# Patient Record
Sex: MALE | Race: WHITE
[De-identification: names, ages, dates, MRNs, and addresses within clinical notes are randomized per-mention and may not be internally consistent; named-entity substitution may affect disease eponyms.]

---

## 2017-02-11 ENCOUNTER — HOSPITAL ENCOUNTER (INPATIENT)
Dept: HOSPITAL 68 - ERH | Age: 82
LOS: 3 days | Discharge: HOME HEALTH | DRG: 309 | End: 2017-02-14
Attending: INTERNAL MEDICINE | Admitting: INTERNAL MEDICINE
Payer: COMMERCIAL

## 2017-02-11 VITALS — BODY MASS INDEX: 41.77 KG/M2 | WEIGHT: 308.37 LBS | HEIGHT: 72 IN

## 2017-02-11 VITALS — SYSTOLIC BLOOD PRESSURE: 100 MMHG | DIASTOLIC BLOOD PRESSURE: 80 MMHG

## 2017-02-11 VITALS — DIASTOLIC BLOOD PRESSURE: 70 MMHG | SYSTOLIC BLOOD PRESSURE: 126 MMHG

## 2017-02-11 DIAGNOSIS — I49.3: ICD-10-CM

## 2017-02-11 DIAGNOSIS — I35.0: ICD-10-CM

## 2017-02-11 DIAGNOSIS — I25.10: ICD-10-CM

## 2017-02-11 DIAGNOSIS — L40.9: ICD-10-CM

## 2017-02-11 DIAGNOSIS — M10.9: ICD-10-CM

## 2017-02-11 DIAGNOSIS — M19.90: ICD-10-CM

## 2017-02-11 DIAGNOSIS — I11.9: ICD-10-CM

## 2017-02-11 DIAGNOSIS — I48.92: ICD-10-CM

## 2017-02-11 DIAGNOSIS — I48.91: Primary | ICD-10-CM

## 2017-02-11 DIAGNOSIS — E78.5: ICD-10-CM

## 2017-02-11 DIAGNOSIS — I05.0: ICD-10-CM

## 2017-02-11 DIAGNOSIS — M81.0: ICD-10-CM

## 2017-02-11 DIAGNOSIS — Z95.1: ICD-10-CM

## 2017-02-11 DIAGNOSIS — J44.1: ICD-10-CM

## 2017-02-11 DIAGNOSIS — R00.8: ICD-10-CM

## 2017-02-11 DIAGNOSIS — Z95.2: ICD-10-CM

## 2017-02-11 DIAGNOSIS — I45.10: ICD-10-CM

## 2017-02-11 LAB
ABSOLUTE GRANULOCYTE CT: 2.7 /CUMM (ref 1.4–6.5)
APTT BLD: 34 SEC (ref 25–37)
APTT BLD: 71 SEC (ref 25–37)
BASOPHILS # BLD: 0 /CUMM (ref 0–0.2)
BASOPHILS NFR BLD: 0.1 % (ref 0–2)
EOSINOPHIL # BLD: 0.2 /CUMM (ref 0–0.7)
EOSINOPHIL NFR BLD: 4 % (ref 0–5)
ERYTHROCYTE [DISTWIDTH] IN BLOOD BY AUTOMATED COUNT: 14.6 % (ref 11.5–14.5)
GRANULOCYTES NFR BLD: 69.6 % (ref 42.2–75.2)
HCT VFR BLD CALC: 39.8 % (ref 42–52)
LYMPHOCYTES # BLD: 0.7 /CUMM (ref 1.2–3.4)
MCH RBC QN AUTO: 28.7 PG (ref 27–31)
MCHC RBC AUTO-ENTMCNC: 33.1 G/DL (ref 33–37)
MCV RBC AUTO: 86.8 FL (ref 80–94)
MONOCYTES # BLD: 0.4 /CUMM (ref 0.1–0.6)
PLATELET # BLD: 115 /CUMM (ref 130–400)
PMV BLD AUTO: 8.3 FL (ref 7.4–10.4)
PROTHROMBIN TIME: 11.5 SEC (ref 9.4–12.5)
RED BLOOD CELL CT: 4.58 /CUMM (ref 4.7–6.1)
WBC # BLD AUTO: 4 /CUMM (ref 4.8–10.8)

## 2017-02-11 NOTE — ED INFLUENZA/URI COMPLAINT
History of Present Illness
 
General
Chief Complaint: Upper Respiratory Sx/Fever
Stated Complaint: URI
Source: patient, family
Exam Limitations: no limitations
 
Vital Signs & Intake/Output
Vital Signs & Intake/Output
 Vital Signs
 
 
Date Time Temp Pulse Resp B/P Pulse O2 O2 Flow FiO2
 
     Ox Delivery Rate 
 
 1431    120/84    
 
 1250 96.3 70 18 124/86 97 Room Air  
 
 1107     95   
 
 1015     98   
 
 1000 96.0 74 20 129/93 98 Room Air  
 
 
Allergies
Coded Allergies:
NO KNOWN ALLERGIES (16)
 
Reconcile Medications
Albuterol Sulfate (Ventolin Hfa) 18 GM HFA.AER.AD   2 PUF INH Q4-6 PRN PRN 
ASTHMA  (Reported)
Aspirin (Ecotrin) 81 MG TABLET.DR   1 TAB PO DAILY HEART HEALTH  (Reported)
Calcipotriene 60 GM CREAM..G.   1 TATO TOP AD SKIN  (Reported)
FLUTICASONE/SALMETEROL (Advair 250-50 Diskus) 1 DSK DSK   1 PUFF INH BID 
BREATHING PROBLEMS  (Reported)
Folic Acid 1 MG TABLET   1 MG PO DAILY FOLIC ACID SUPPLEMENT  (Reported)
Furosemide (Lasix) 20 MG TAB   1 TAB PO DAILY CHF  (Reported)
Hydroxyzine HCl 25 MG TABLET   1 TAB PO Q8H itch
Magnesium Oxide 400 MG TABLET   400 MG PO DAILY supplement
Meloxicam 15 MG TABLET   1 TAB PO DAILY PAIN  (Reported)
Metoprolol Succ XL (Toprol XL) 25 MG TAB   25 MG PO DAILY heart
Simvastatin 40 MG TABLET   1 TAB PO QPM CHOLESTEROL  (Reported)
Triamcinolone Acetonide 15 GM OINT...G.   1 TATO TOP AD SKIN  (Reported)
     apply to affected area(s)
 
Triage Note:
TRIAGE; PT TO ED C/O TIGHTNESS TO MIDSTERNAL
REGION. HAS A HX OF ASTHMA. WENT TO PCP THE OTHER
DAY AND TOLD IT MAY BE THE START OF A CHEST COLD.
RA SAT 98% IN TRIAGE. PAIN IS NONRADIATING, FEELS
SOB UPON EXERTION. USED NEBULIZER AT HOME PTA WITH
SOME RELIEF.
Triage Nurses Notes Reviewed? yes
HPI:
83-year-old male arrives through triage to room 2 for evaluation of a cough that
started last night.  He has a history of asthma and has been using his inhalers 
since last night with no relief.  He denies any fever or chills but definitely 
complains of chest pain, tightness and shortness of breath.  He denies any 
abdominal pain, nausea, vomiting or diarrhea.  He reports he has been eating and
drinking well.  He reports his cough is productive green sputum and he denies 
any recent illness or being around anybody that has been sick.  He reports the 
chest pain is a tightness sensation around the top of his chest.  Mild at this 
time.
(EDWARDO LAMAS)
 
Past History
 
Travel History
Traveled to [place] past 21 day No
 
Medical History
Any Pertinent Medical History? see below for history
Neurological: NONE
EENT: PSORIASIS
Cardiovascular: CAD, mitral stenosis
Respiratory: asthma
Gastrointestinal: NONE
Hepatic: NONE
Renal: NONE
Musculoskeletal: gout, OSTEOARTHRITIS
Psychiatric: NONE
Endocrine: NONE
Blood Disorders: NONE
Cancer(s): NONE
GYN/Reproductive: NONE
Other Medical Hx:
Psoriasis
History of MRSA: No
History of VRE: No
History of CDIFF: No
Influenza Vaccine: 10/01/16
 
Surgical History
Surgical History: CABG
 
Psychosocial History
Who do you live with Patient/Self
Services at Home Home Health Aide, Nursing
What is your primary language English
Tobacco Use: Never used
 
Family History
Family History, If Any:
SISTER
  FH: diabetes mellitus
 
Hx Contributory? No
 
ECHO Results (as available)
EF% 55
(EDWARDO LAMAS)
 
Review of Systems
 
Review of Systems
Constitutional:
Denies: no symptoms. 
EENTM:
Denies: no symptoms. 
Respiratory:
Reports: cough, short of breath, sputum production. 
Cardiovascular:
Reports: see HPI, chest pain, edema. 
GI:
Denies: no symptoms. 
Genitourinary:
Denies: no symptoms. 
Musculoskeletal:
Denies: no symptoms. 
Skin:
Denies: no symptoms. 
Neurological/Psychological:
Denies: no symptoms. 
Hematologic/Endocrine:
Denies: no symptoms. 
Immunologic/Allergic:
Denies: no symptoms. 
(EDWARDO LAMAS)
 
Physical Exam
 
Physical Exam
General Appearance: well developed/nourished, no apparent distress, alert, awake
Head: atraumatic, normal appearance
Eyes:
Bilateral: normal appearance, PERRL, EOMI. 
Ears, Nose, Throat: normal ENT inspection, moist mucous membrane
Neck: normal inspection, supple, full range of motion
Respiratory: wheezing
Cardiovascular: irregularly irregular
Peripheral Pulses:
2+ radial (R), 2+ radial (L)
Gastrointestinal: normal bowel sounds, soft, non-tender
Back: normal inspection, normal range of motion
Extremities: swelling (BILATERAL LOWER EXTREMITY KHADIJAH)
Neurologic/Psych: no motor/sensory deficits, awake, alert, oriented x 3, normal 
gait, normal mood/affect
Skin: intact, normal color, warm/dry
 
Core Measures
Severe Sepsis Present: No
Septic Shock Present: No
(EDWARDO LAMAS)
 
Progress
Differential Diagnosis: pneumonia, ami, copd EXACERBATION, ASTHMA EXACERBATION, 
BRONCHITIS, NEW ONSET a. FIB OR a. FIB
Plan of Care:
 Orders
 
 
Procedure Date/time Status
 
CBC WITHOUT DIFFERENTIAL  0600 Active
 
BASIC ELECTROLYTES PLUS BUN&CR  0600 Active
 
Regular Diet  L Active
 
LACTIC ACID  1732 Active
 
TROPONIN LEVEL  1700 Active
 
TROPONIN LEVEL  1432 Active
 
LACTIC ACID  1432 Active
 
CULTURE,URINE  1425 Active
 
LOWER RESPIRATORY CULTURE  1425 Active
 
URINALYSIS  1425 Active
 
Vital Signs  1420 Active
 
Teach/Educate  1420 Active
 
Nutritional Intake, Monitor  1420 Active
 
Isolation  1420 Active
 
Intake & Output  1420 Active
 
Patient Care Conference  1420 Active
 
Activity/Ambulation  1420 Active
 
TRC EVALUATION (GEN)  1328 Active
 
Pathway - chart  1328 Active
 
House Staff  1328 Active
 
Patient Data  1328 Active
 
Code Status  1328 Active
 
Intake & Output  1248 Active
 
Patient Data  1225 Active
 
OXYGEN SETUP (GEN)  1222 Active
 
Saline Lock  1222 Active
 
Admit to inpatient  1222 Active
 
Vital Signs  1222 Active
 
Activity/Ambulation  1222 Active
 
Code Status  1222 Complete
 
Add-on Test (ER Only)  1055 Active
 
AEROSOL (GEN)  1047 Complete
 
Saline Lock  1026 Active
 
Telemetry/Cardiac Monitor  1026 Active
 
BLOOD CULTURE  1026 Active
 
TROPONIN LEVEL  1026 Complete
 
PARTIAL THROMBOPLASTIN TIME  1026 Complete
 
PROTHROMBIN TIME  1026 Complete
 
COMPREHENSIVE METABOLIC PANEL  1026 Complete
 
CBC WITHOUT DIFFERENTIAL  1026 Complete
 
B-TYPE NATRIURETIC PEP (BNP)  1026 Complete
 
EKG  1026 Active
 
Weight   UNK Active
 
VTE Mechanical Prophylaxis   UNK Active
 
Vital Signs   UNK Complete
 
Telemetry/Cardiac Monitor   UNK Active
 
Intake & Output   UNK Complete
 
 
 Current Medications
 
 
  Sig/Angela Start time  Last
 
Medication Dose  Stop Time Status Admin
 
Azithromycin 500 MG DAILY@1230  1230 AC 
 
(Zithromax)     
 
Dextrose/Water 250 ML    
 
(D5W)     
 
Ceftriaxone Sodium 1,000 MG DAILY@1230  1230 AC 
 
(Rocephin)     
 
Prednisone 60 MG DAILY  1000 AC 
 
Acetaminophen 650 MG Q6 PRN  1330 AC 
 
(Tylenol)     
 
Oxycodone HCl 5 MG Q6P PRN  1330 AC 
 
(Roxicodone)     
 
Oxycodone/ 2 TAB Q6P PRN  1330 AC 
 
Acetaminophen     
 
(Percocet)     
 
 
 Laboratory Tests
 
 
 
17 1444:
Lactic Acid Pending, Troponin I Pending
 
17 1111:
PT 11.5, INR 1.10, APTT 34
 
17 1100:
Anion Gap 8, Estimated GFR > 60, BUN/Creatinine Ratio 19.0, Glucose 85, Calcium 
8.2  L, Total Bilirubin 0.7, AST 35, ALT 44, Alkaline Phosphatase 76, Troponin I
0.04, Pro-B-Natriuretic Pept 4390  H, Total Protein 6.2  L, Albumin 3.5, 
Globulin 2.7, Albumin/Globulin Ratio 1.3, CBC w Diff NO MAN DIFF REQ, RBC 4.58  
L, MCV 86.8, MCH 28.7, RDW 14.6  H, MPV 8.3, Gran % 69.6, Lymphocytes % 17.1  L,
Monocytes % 9.2, Eosinophils % 4.0, Basophils % 0.1, Absolute Granulocytes 2.7, 
Absolute Lymphocytes 0.7  L, Absolute Monocytes 0.4, Absolute Eosinophils 0.2, 
Absolute Basophils 0, PUBS MCHC 33.1
 Microbiology
 1425  URINE ROUT: Urine Culture - ORD
 1425  LOWER RESP: Respiratory Culture - ORD
 1425  LOWER RESP: Gram Stain - ORD
 1100  BLOOD: Blood Culture - RECD
 1054  BLOOD: Blood Culture - RECD
 
Diagnostic Imaging:
Viewed by Me: Radiology Read.  Discussed w/RAD: Radiology Read. 
CXR Impression: SEE BELOW
Initial ED EKG: ATRIAL FLUTTER, pvcS, RIGHT BUNDLE BRANCH BLOCK WITH A LEFT 
ANTERIOR FASCICULAR BLOCK
Prior EKG: changed
Comments:
PATIENT: CHRISTELLE CRUZ  MEDICAL RECORD NO: [id number]
PRESENT AGE: 83  PATIENT ACCOUNT NO: [id number]
: 33  LOCATION: [facility]
ORDERING PHYSICIAN: EDWARDO CARRASQUILLO  
 
  SERVICE DATE: 
EXAM TYPE: RAD - XRY-CHEST XRAY, PA AND LATERAL
 
EXAMINATION:
XR CHEST
 
CLINICAL INFORMATION:
Pneumonia
 
COMPARISON:
Chest done on 2016.
 
TECHNIQUE:
2 views of the chest were obtained.
 
FINDINGS:
Low lung volume is present bilaterally. Subtle airspace opacity is noted at
right lung base likely represents hypoventilatory, atelectatic changes or
subtle developing pneumonia. The remainder of the lung fields otherwise
appear clear. The cardiomediastinal silhouette is mildly enlarged. Postop
changes of sternotomy are present. Multilevel degenerative changes are noted
in the spine. There is a prosthetic, likely aortic valve, visualized. There
is no pleural effusion present.
 
IMPRESSION:
Low lung volume bilaterally with interval development of subtle airspace
disease at right lung base, may represent hypoventilatory, atelectatic
changes versus subtle developing pneumonia. Followup radiograph to resolution
is recommended. No other significant change since 2016.
 
DICTATED BY: MANUEL CELESTE MD 
DATE/TIME DICTATED:17
:RAD.[facility]ODAS 
DATE/TIME TRANSCRIBED:17
 
CONFIDENTIAL, DO NOT COPY WITHOUT APPROPRIATE AUTHORIZATION.
 
 <Electronically signed in Other Vendor System>                                 
          
                                           SIGNED BY: MANUEL CELESTE MD 17
1125
 
12:16 PM spoke to patient and family about blood work, chest x-ray results and 
do ECG finding.  Discussed the case with Dr. Mckenzie.  Hospitalist was notified 
and will admit to telemetry for pneumonia with new onset atrial flutter.  Did 
not start anticoagulation at this time, rate controlled and will discuss further
with Dr. Tolentino.
 
 
1:40 PM spoke to Dr. Tolentino we will start heparin drip with a bolus.  Rectal 
exam negative.
(CHONG CARRASQUILLO,EDWARDO)
 
Departure
 
Departure
Time of Disposition: 1217
Disposition: STILL A PATIENT
Condition: Stable
Clinical Impression
Primary Impression: New onset atrial flutter
Secondary Impressions: 
Pneumonia
Qualifiers:  Pneumonia type: due to unspecified organism Laterality: right Lung 
location: lower lobe of lung Qualified Code: J18.1 - Lobar pneumonia, 
unspecified organism
 
Referrals:
ALBERTA ALONZO (PCP/Family)
 
Departure Forms:
Customer Survey
General Discharge Information
 
Admission Note
Spoke With:
AMARIS ROSS MD
Documentation of Exam:
Documentation of any treatments & extenuating circumstances including Concerns 
Regarding Discharge (functional status, medication knowledge or non-compliance, 
living conditions, etc.) that warrant an admission rather than observation: 
Admission to telemetry for new onset atrial flutter, cardiology consult to Dr. Tolentino.  Also admission for pneumonia will need respiratory treatments, IV 
antibiotics, steroids.
 
(EDWARDO LAMAS)
 
PA/NP Co-Sign Statement
Statement:
ED Attending supervision documentation-
 
x I saw and evaluated the patient. I have also reviewed all the pertinent lab 
results and diagnostic results. I agree with the findings and the plan of care 
as documented in the PA's/NP's documentation. 
 
[] I have reviewed the ED Record and agree with the PA's/NP's documentation.
 
[] Additions or exceptions (if any) to the PAs/NP's note and plan are 
summarized below:
[]
 
(SALLY LORENZO,MILES)

## 2017-02-11 NOTE — RADIOLOGY REPORT
EXAMINATION:
XR CHEST
 
CLINICAL INFORMATION:
Pneumonia
 
COMPARISON:
Chest done on 12/13/2016.
 
TECHNIQUE:
2 views of the chest were obtained.
 
FINDINGS:
Low lung volume is present bilaterally. Subtle airspace opacity is noted at
right lung base likely represents hypoventilatory, atelectatic changes or
subtle developing pneumonia. The remainder of the lung fields otherwise
appear clear. The cardiomediastinal silhouette is mildly enlarged. Postop
changes of sternotomy are present. Multilevel degenerative changes are noted
in the spine. There is a prosthetic, likely aortic valve, visualized. There
is no pleural effusion present.
 
IMPRESSION:
Low lung volume bilaterally with interval development of subtle airspace
disease at right lung base, may represent hypoventilatory, atelectatic
changes versus subtle developing pneumonia. Followup radiograph to resolution
is recommended. No other significant change since 12/13/2016.

## 2017-02-11 NOTE — HISTORY & PHYSICAL
JACINDA UNDERWOOD 02/11/17 1710:
General Information and HPI
MD Statement:
I have seen and personally examined CHRISTELLE CRUZ and documented this H&P.
 
The patient is a 83 year old M who presented with a patient stated chief 
complaint of [DYSPNEA].
 
Source of Information: patient
Exam Limitations: no limitations
History of Present Illness:
This is 82 YO male with past medical history of CAD S/P CABG, aortic stenosis, 
psoriasis, osteoarthritis, gout came in today from home after chief complaints 
of shortness of breath and cought with greenish sputum.
 
Apparently the patient was doing okay untill since last few days and especially 
since today mromega he started feeling shortenss of breath and productive cough 
with greenish sputum.He denied any chest pain, PND,fever, chiils, recent illness
, any sick contact.
 
He lives by himself and normally walks with a canem does all his IALDS himself. 
 
Allergies/Medications
Allergies:
Coded Allergies:
NO KNOWN ALLERGIES (05/17/16)
 
Home Med list
Albuterol Sulfate (Ventolin Hfa) 18 GM HFA.AER.AD   2 PUF INH Q4-6 PRN PRN 
ASTHMA  (Reported)
Aspirin (Ecotrin) 81 MG TABLET.DR   1 TAB PO DAILY HEART HEALTH  (Reported)
Calcipotriene 60 GM CREAM..G.   1 TATO TOP AD SKIN  (Reported)
FLUTICASONE/SALMETEROL (Advair 250-50 Diskus) 1 DSK DSK   1 PUFF INH BID 
BREATHING PROBLEMS  (Reported)
Folic Acid 1 MG TABLET   1 MG PO DAILY FOLIC ACID SUPPLEMENT  (Reported)
Furosemide (Lasix) 20 MG TAB   1 TAB PO DAILY CHF  (Reported)
Hydroxyzine HCl 25 MG TABLET   1 TAB PO Q8H itch
Magnesium Oxide 400 MG TABLET   400 MG PO DAILY supplement
Meloxicam 15 MG TABLET   1 TAB PO DAILY PAIN  (Reported)
Metoprolol Succ XL (Toprol XL) 25 MG TAB   25 MG PO DAILY heart
Simvastatin 40 MG TABLET   1 TAB PO QPM CHOLESTEROL  (Reported)
Triamcinolone Acetonide 15 GM OINT...G.   1 TATO TOP AD SKIN  (Reported)
     apply to affected area(s)
 
Compliance With Home Meds: GOOD
 
Past History
 
Travel History
Traveled to [place] past 21 day No
 
Medical History
Blood Transfusion Hx: No
Neurological: NONE
EENT: PSORIASIS
Cardiovascular: CAD, mitral stenosis
Respiratory: asthma
Gastrointestinal: NONE
Hepatic: NONE
Renal: NONE
Musculoskeletal: gout, OSTEOARTHRITIS
Psychiatric: NONE
Endocrine: NONE
Blood Disorders: NONE
Cancer(s): NONE
GYN/Reproductive: NONE
Other Medical Hx:
Psoriasis
History of MRSA: No
History of VRE: No
History of CDIFF: No
Isolation History: Standard
Influenza Vaccine: 10/01/16
 
Surgical History
Surgical History: CABG
 
ECHO Results (as available)
EF% 55
 
Past Family/Social History
 
Family History
Relations & Conditions if any
SISTER
  FH: diabetes mellitus
 
 
Psychosocial History
Where do you live? Home
Who Do You Live With? self
Services at Home: Home Health Aide, Nursing
Smoking Status: Never Smoked
ETOH Use: occasional use
Illicit Drug Use: denies illicit drug use
 
Functional Ability
ADLs
Independent: dressing, eating, toileting, bathing. 
Ambulation: independent
IADLs
Independent: shopping, housework, finances, food prep, telephone, transportation
, medication admin. 
 
Review of Systems
 
Review of Systems
Constitutional:
Denies: chills, diaphoresis, fever, malaise, weakness. 
EENTM:
Reports: no symptoms. 
Respiratory:
Reports: cough, sputum production, wheezing.  Denies: hemoptysis, orthopnea, 
short of breath. 
GI:
Denies: abdominal pain, bloating, constipation, diarrhea, distention. 
Genitourinary:
Denies: discharge, dysuria, frequency, hematuria. 
Musculoskeletal:
Denies: back pain, gout, joint pain, joint swelling. 
Skin:
Denies: cysts, change in skin color, change in hair/nails, dryness. 
Neurological/Psychological:
Reports: no symptoms. 
Hematologic/Endocrine:
Reports: no symptoms. 
Immunologic/Allergic:
Reports: no symptoms. 
All Other Systems: Reviewed and Negative
 
Exam & Diagnostic Data
Last 24 Hrs of Vital Signs/I&O
 Vital Signs
 
 
Date Time Temp Pulse Resp B/P Pulse O2 O2 Flow FiO2
 
     Ox Delivery Rate 
 
02/11 1621 97.5 72 18 126/70 95 Room Air  
 
02/11 1511     97 Nasal 2.0L 
 
      Cannula  
 
02/11 1431    120/84    
 
02/11 1250 96.3 70 18 124/86 97 Room Air  
 
02/11 1107     95   
 
02/11 1015     98   
 
02/11 1000 96.0 74 20 129/93 98 Room Air  
 
 
 Intake & Output
 
 
 02/11 1600 02/11 0800 02/11 0000
 
Intake Total 250  
 
Output Total 400  
 
Balance -150  
 
    
 
Intake, Oral 250  
 
Output, Urine 400  
 
Patient 144.696 kg  
 
Weight   
 
 
 
 
Physical Exam
General Appearance Alert, Oriented X3, Cooperative, No Acute Distress
Skin No Rashes, No Breakdown, No Significant Lesion
HEENT Atraumatic, PERRLA, EOMI
Neck Supple, No JVD, No thryomegaly
Lymphatic B/L LOWER EXTREMITY EDEMA
Cardiovascular Normal S1, Normal S2, No Murmurs
Lungs B/L WHEEZING PRESENT
Abdomen Normal Bowel Sounds, Soft, No Tenderness
Neurological Strength at 5/5 X4 Ext, Normal Tone, Sensation Intact, Cranial 
Nerves 3-12 NL
Extremities No Clubbing, No Cyanosis, No Edema, Normal Pulses
Vascular Normal Pulses
Last 24 Hrs of Labs/Dusty:
 Laboratory Tests
 
02/11/17 1815:
Troponin I Pending
 
02/11/17 1815:
Lactic Acid Pending
 
02/11/17 1455:
Urine Color STRAW, Urine Clarity CLEAR, Urine pH 6.0, Ur Specific Gravity <= 
1.005, Urine Protein NEG, Urine Ketones NEG, Urine Nitrite NEG, Urine Bilirubin 
NEG, Urine Urobilinogen 1.0, Ur Leukocyte Esterase TRACE  H, Ur Microscopic 
SEDIMENT EXAMINED, Urine WBC RARE, Ur Epithelial Cells RARE, Urine Hemoglobin 
NEG, Urine Glucose NEG
 
02/11/17 1444:
Lactic Acid 1.5, Troponin I 0.04
 
02/11/17 1111:
PT 11.5, INR 1.10, APTT 34
 
02/11/17 1100:
Anion Gap 8, Estimated GFR > 60, BUN/Creatinine Ratio 19.0, Glucose 85, Calcium 
8.2  L, Total Bilirubin 0.7, AST 35, ALT 44, Alkaline Phosphatase 76, Troponin I
0.04, Pro-B-Natriuretic Pept 4390  H, Total Protein 6.2  L, Albumin 3.5, 
Globulin 2.7, Albumin/Globulin Ratio 1.3, CBC w Diff NO MAN DIFF REQ, RBC 4.58  
L, MCV 86.8, MCH 28.7, RDW 14.6  H, MPV 8.3, Gran % 69.6, Lymphocytes % 17.1  L,
Monocytes % 9.2, Eosinophils % 4.0, Basophils % 0.1, Absolute Granulocytes 2.7, 
Absolute Lymphocytes 0.7  L, Absolute Monocytes 0.4, Absolute Eosinophils 0.2, 
Absolute Basophils 0, PUBS MCHC 33.1
 Microbiology
02/11 1455  URINE ROUT: Urine Culture - RECD
02/11 1455  LOWER RESP: Respiratory Culture - RES
02/11 1455  LOWER RESP: Gram Stain - RES
02/11 1100  BLOOD: Blood Culture - RECD
02/11 1054  BLOOD: Blood Culture - RECD
 
 
 
Diagnostic Data
EKG Results
will repeat EKG as the EKG was not good qualtiy
CXR Results
IMPRESSION:
Low lung volume bilaterally with interval development of subtle airspace
disease at right lung base, may represent hypoventilatory, atelectatic
changes versus subtle developing pneumonia. Followup radiograph to resolution
is recommended. No other significant change since 12/13/2016.
 
 
Assessment/Plan
Assessment:
This is 82 YO male with past medical history of CAD S/P CABG, aortic stenosis, 
psoriasis, osteoarthritis, gout, MS,COPD not on home oxygen ( need to confirm 
with niece came in today from home after chief complaints of shortness of breath
and cought with greenish sputum and substernal chest discomfort.
 
Vitals : afebrile, normotensive, pulse - 70, rr - 20, 95% on 2 litres.
Labs : white count - 4.0. h/h - 13.1/39.8, plt - 115
 
CXR : Low lung volume bilaterally with interval development of subtle airspace
disease at right lung base, may represent hypoventilatory, atelectatic
changes versus subtle developing pneumonia. Followup radiograph to resolution
is recommended.
 
1st lactic acid < 2 , but secodn icnreased at 3, will check another with am labs
to trend.
 
WE will admit the patient to telemetry for continuos cardiac monitoring.
 
Problem list alongwith assesment and plan :
#1 substernal Chest discomfort/ rule out ACS
* He descrined cheat pain which worsened with coughing, 3 to 4/10, denied 
pressure like pain or radiation.
* Continue to monitor vitals every shift.
* Continue telemetry monitoring, first set of troponin negatvie, continue to 
trend ekg and troponin.
* The first EKF had lot of artifacts, therefore please repeat EKG.
* Cardiology consulted.
* We will continue home medication of metoprolol and aspirin.
 
#2 Suspected Right Lower Lobe pnuemonia-
* He is afebrile, no white count, however he complained of cough with green 
sputum.
* Patient received one time ceftrixone and azithromycin.
* continue to follow pancultures.
* Ct azithromycin iv and ceftrixone for now, if cultures are negatvie and Mr. Gonzalez is stable, consider DC AX.
 
#3 Atrial Flutter v/s fib?
* initial EKF showed A fib, however it had lot of artifact
* ? A fib
* repeat EKG.
* ER doctor talked to DR garrido and he was started on iv heaprin
* Ct iv heparin
* continue to folloe cardiology.
 
 
#4 COPD Exacerbation
* PAtient had b/l wheezind and soudned a little tigth in chest.
* Ct TRC mebulisations.
* He is not on home oxygen
* ct o2 by nasal cannula
* taper as tolerated.
* Will treat with po prednisone 60 mg daily, ct to taper.
* Ct home inhalers.
 
#5 Hyperlipidemia
* Ct statin at home dosage.
 
#6 Psoriasis- 
* Ct hoem medication, calciptreione, triamcinolone for local application
 
#7 Lower extremtiy edema
* Ct lasix 20 mg Daily.
 
 
FC
DVT px : iv heaprin
heart healthy diet
 
 
As Ranked By This Provider
Problem List:
 1. Arthritis, wrist
 
 2. Hypoxia
 
 3. HTN (hypertension)
 
 4. New onset atrial flutter
 
 5. Hypotension
 
 
Core Measures/Miscellaneous
 
Acute Coronary Syndrome
ACS Diagnosis: No
 
Cerebrovascular Accident
CVA/TIA Diagnosis: No
 
Congestive Heart Failure
CHF Diagnosis: No
 
Venous Thromboembolism
VTE Risk Factors: Age > 40
VTE Prophylaxis Ordered Inpt: Pharm- Heparin
No [facility] V[facility] prophylaxis d/t: No contraindications
No VTE Pharm Prophylaxis d/t: No contraindications
VTE Diagnosis: No
VTE Type: NONE
VTE Confirmed by (Test): NONE
 
Severe Sepsis
Severe Sepsis Present: No
 
Septic Shock
Septic Shock Present: No
 
Miscellaneous Documentation
Attending Case Discussed With:
AMARIS ROSS MD
 
Primary Care Physician:
ALBERTA ALONZO
 
Patient sees these Specialists
cardio
Level of Patient Care: Telemetry
 
Resident Review Statement
Resident Statement: admitted by AMARIS Granados MD 02/11/17 1694:
Attending MD Review Statement
 
Attending Statement
Attending MD Statement: examined this patient, discuss w/resident/PA/NP, agreed 
w/resident/PA/NP, reviewed EMR data (avail), reviewed images, amended to note
Attending Assessment/Plan:
The patient is an 82 yo male with h/o psoriasis, HL, CAD (s/p CABG), obstructive
lung disease, mitral stenosis, gout, and HTN who presented in the ED on the day 
of admission with complaint of cough and dyspnea with sudden onset today along 
with some substernal chest discomfort (associated with cough). Cough has been 
productive of some green sputum. EKG in the ED suggested ?atrial fibrillation 
with PVCs (poor tracing). He denied any fever, chills or palpitations. CXR 
showed possible RLL infiltrate/atelectasis.
 
Physical Exam:
VS: T 96, P 74, R 20, /93, PO 98% RA
HEENT: eyes- PERRLA, EOMI
           janette- dry mucosa, w/o lesions
Neck: No JVD or bruits
Chest: + mild bilat wheeze with diminished BS and occasional rhonchi
Cor: sl irreg rhythm, nl rate, nl S1, S2 w/o murm
Abd: BS+, soft, NT, - HSM
Ext: no edema, pulses 2+
Neuro: non-focal
 
Labs/Tests: as above
 
Impression/Plan:
#Chest Pain/CAD- doubt ischemia pain as is atypical and associated with cough. 
Does have h/o CAD and is S/P CABG.
Plan: Admit to telemetry. 
        Check serial EKG's & troponins as per protocol.
        Cardiology consult- Dr. Tolentino (cover for Dr. Lopez).
         Continue Metoprolol and ASA.
 
#Atrial Fibrillation- ? initial EKG read as afib which would be new for this 
patient. There is a great deal of baseline artifact. 
Plan: Repeat EKG and monitor on telemetry. IV heparin per Dr. Tolentino. 
Continue Metoprolol.
 
#Right Lower Lobe infiltrate- vs atelectasis. Patient with green sputum, however
no fever or chills. Begun on Abx in ED. ? Community acquired pneumonia.
Plan: Will culture sputum and follow exam. Ceftriaxone/Zithromax begun in ED.
 
#COPD Exacerbation- some wheeze noted.
Plan: Albuterol aerosol, po prednisone and follow exam.
 
#Hyperlipidemia- on Simvastatin.
Plan: Continue Simvastatin.
 
#Psoriasis- on Calcipotriene Cream.
Plan: Continue Cream.

## 2017-02-11 NOTE — CONS- CARDIOLOGY
General Information and HPI
 
Consulting Request
Date of Consult: 02/10/17
Requested By:
AMARIS ROSS MD
 
Reason for Consult:
A-Fib
History of Present Illness:
The patient is an 80-year-old male with history of CAD, status post CABG, 
hypertension, COPD who presents to the emergency department with complaint of 
cough and shortness of breath times one day.  The cough has been practically 
greenish sputum.  He also complained of substernal chest tightness H was not 
exertional and did not radiate.  The discomfort was a 3-4 out of 10 in severity.
 In the emergency department he was found to be in new atrial fibrillation.  He 
was also noted to have evidence of pneumonia.  No diaphoresis.  No syncope.  No 
lightheadedness or dizziness.  No nausea or vomiting.
 
Allergies/Medications
Allergies:
Coded Allergies:
NO KNOWN ALLERGIES (05/17/16)
 
Home Med List:
Albuterol Sulfate (Ventolin Hfa) 18 GM HFA.AER.AD   2 PUF INH Q4-6 PRN PRN 
ASTHMA  (Reported)
Apixaban (Eliquis) 5 MG TABLET   5 MG PO Q12H AF
Aspirin (Ecotrin) 81 MG TABLET.DR   1 TAB PO DAILY HEART HEALTH  (Reported)
Calcipotriene 60 GM CREAM..G.   1 TATO TOP AD SKIN  (Reported)
FLUTICASONE/SALMETEROL (Advair 250-50 Diskus) 1 DSK DSK   1 PUFF INH BID 
BREATHING PROBLEMS  (Reported)
Folic Acid 1 MG TABLET   1 MG PO DAILY FOLIC ACID SUPPLEMENT  (Reported)
Furosemide (Lasix) 20 MG TAB   1 TAB PO DAILY CHF  (Reported)
Hydroxyzine HCl 25 MG TABLET   1 TAB PO Q8H itch
Magnesium Oxide 400 MG TABLET   400 MG PO DAILY supplement
Meloxicam 15 MG TABLET   1 TAB PO DAILY PAIN  (Reported)
Metoprolol Succ XL (Toprol XL) 25 MG TAB   25 MG PO DAILY heart
Prednisone 10 MG TABLET   1 TAB PO BID steroid taper
     On           Take
     2/14-2/16    3 tablets
     2/17-2/18    2 tablets
     2/19-2/20    1 tablets
     then stop
Simvastatin 40 MG TABLET   1 TAB PO QPM CHOLESTEROL  (Reported)
Triamcinolone Acetonide 15 GM OINT...G.   1 TATO TOP AD SKIN  (Reported)
     apply to affected area(s)
 
Current Medications:
 Current Medications
 
 
  Sig/Angela Start time  Last
 
Medication Dose Route Stop Time Status Admin
 
Acetaminophen 650 MG Q6 PRN 02/11 1330 AC 
 
  PO   
 
Albuterol Sulfate 3 ML ONCE ONE 02/11 1030 DC 02/11
 
  INH 02/11 1031  1046
 
Azithromycin 500 MG DAILY@1230 02/12 1230 AC 
 
Dextrose/Water 250 ML IV   
 
Azithromycin 500 MG ONCE ONE 02/11 1215 DC 02/11
 
Dextrose/Water 250 ML IV 02/11 1314  1233
 
Ceftriaxone Sodium 1,000 MG DAILY@1230 02/12 1230 AC 
 
  IV   
 
Ceftriaxone Sodium 0 .STK-MED ONE 02/11 1220 DC 
 
  .ROUTE   
 
Ceftriaxone Sodium 1,000 MG ONCE ONE 02/11 1215 DC 02/11
 
  IV 02/11 1216  1227
 
Enoxaparin Sodium 30 MG DAILY 02/11 1327 DC 
 
  SC   
 
Heparin Sodium  0 .STK-MED ONE 02/11 1350 DC 
 
(Porcine)  .ROUTE   
 
Heparin Sodium  5,000 UNIT ONCE ONE 02/11 1345 DC 02/11
 
(Porcine)  IV 02/11 1346  1423
 
N/A 1 UNIT    
 
Heparin Sodium/ 25,000 UNIT Q24H 02/11 1345 AC 02/11
 
Dextrose  IV   1427
 
Dextrose/Water 500 ML    
 
Ipratropium Bromide 2.5 ML ONCE ONE 02/11 1030 DC 02/11
 
  INH 02/11 1031  1046
 
Oxycodone HCl 5 MG Q6P PRN 02/11 1330 AC 
 
  PO   
 
Oxycodone/ 2 TAB Q6P PRN 02/11 1330 AC 
 
Acetaminophen  PO   
 
Prednisone 60 MG DAILY 02/12 1000 AC 
 
  PO   
 
Prednisone 0 .STK-MED ONE 02/11 1146 DC 
 
  PO   
 
Prednisone 60 MG ONCE ONE 02/11 1145 DC 02/11
 
  PO 02/11 1146  1147
 
 
 
 
Review of Systems
Review of Systems:
No rash.  No tremor.  No melena.  No syncope.  All other systems were reviewed, 
and were noted to be negative.
 
Past History
 
Travel History
Traveled to [place] past 21 day No
 
Medical History
Blood Transfusion Hx: No
Neurological: NONE
EENT: PSORIASIS
Cardiovascular: CAD, mitral stenosis
Respiratory: asthma
Gastrointestinal: NONE
Hepatic: NONE
Renal: NONE
Musculoskeletal: gout, OSTEOARTHRITIS
Psychiatric: NONE
Endocrine: NONE
Blood Disorders: NONE
Cancer(s): NONE
GYN/Reproductive: NONE
Other Medical Hx:
Psoriasis
 
Surgical History
Surgical History: CABG
 
Family History
Relations & Conditions If Any:
SISTER
  FH: diabetes mellitus
 
 
Psychosocial History
Where Do You Live? Home
Who Do You Live With? self
Services at Home: Home Health Aide, Nursing
Smoking Status: Never Smoked
ETOH Use: occasional use
Illicit Drug Use: denies illicit drug use
 
Functional Ability
ADLs
Independent: dressing, eating, toileting, bathing. 
Ambulation: independent
IADLs
Independent: shopping, housework, finances, food prep, telephone, transportation
, medication admin. 
 
ECHO Results (as available)
EF% 55
 
Exam & Diagnostic Data
Vital Signs and I&O
Vital Signs
 
 
Date Time Temp Pulse Resp B/P Pulse O2 O2 Flow FiO2
 
     Ox Delivery Rate 
 
02/11 1621 97.5 72 18 126/70 95 Room Air  
 
02/11 1511     97 Nasal 2.0L 
 
      Cannula  
 
02/11 1431    120/84    
 
02/11 1250 96.3 70 18 124/86 97 Room Air  
 
02/11 1107     95   
 
02/11 1015     98   
 
02/11 1000 96.0 74 20 129/93 98 Room Air  
 
 
 Intake & Output
 
 
 02/11 1600 02/11 0800 02/11 0000 02/10 1600 02/10 0800 02/10 0000
 
Intake Total 250     
 
Output Total 400     
 
Balance -150     
 
       
 
Intake, Oral 250     
 
Output, Urine 400     
 
Patient 319 lb     
 
Weight      
 
 
 
Physical Exam:
Gen: The patient is in no acute distress
HEENT: Normal nose, ears, and oropharynx.  Pupils equal bilaterally.  
Conjunctiva normal.
Neck: Supple with no JVD, no masses, and no thyromegaly
Lungs: Bilateral wheeze, scattered rhonchi with normal respiratory effort
Heart: Irregularly irregular, S1, S2, no murmurs.  No peripheral edema, 2+ 
pulses in the lower extremities bilaterally
Abdomen:  Soft, nontender, no masses.  No hepatomegaly.  No splenomegaly
Extremities: No clubbing or cyanosis.  Normal muscle strength in the upper and 
lower extremities
Skin: Normal skin turgor with no skin ulcers or lesions noted.
Neuro: Cranial nerves intact.  Sensation intact
Psych: Alert and oriented  3 with appropriate affect
 
 
 
Labs/Dusty Results:
 Laboratory Tests
 
 
 02/11 02/11 02/11 02/11
 
 2045 1815 1815 1455
 
Chemistry    
 
  Lactic Acid (0.7 - 2.1 mmol/L)   3.0  H 
 
  Troponin I (<0.11 ng/ml)  0.03  
 
Coagulation    
 
  APTT (25 - 37 SEC) 71  H   
 
Urines    
 
  Urine Color (YEL,AMB,STR)    STRAW
 
  Urine Clarity (CLEAR)    CLEAR
 
  Urine pH (5.0 - 8.0)    6.0
 
  Ur Specific Gravity (1.001 - 1.035)    <= 1.005
 
  Urine Protein (NEG,<30 MG/DL)    NEG
 
  Urine Ketones (NEG)    NEG
 
  Urine Nitrite (NEG)    NEG
 
  Urine Bilirubin (NEG)    NEG
 
  Urine Urobilinogen (0.1  -  1.0 EU/dl)    1.0
 
  Ur Leukocyte Esterase (NEG)    TRACE  H
 
  Ur Microscopic    SEDIMENT EXAMINED
 
  Urine WBC (0 - 2 /HPF)    RARE
 
  Ur Epithelial Cells (NONE,FEW)    RARE
 
  Urine Hemoglobin (NEG)    NEG
 
  Urine Glucose (N MG/DL)    NEG
 
 
 
 
 02/11 02/11 02/11
 
 1444 1111 1100
 
Chemistry   
 
  Sodium (137 - 145 mmol/L)   144
 
  Potassium (3.5 - 5.1 mmol/L)   4.1
 
  Chloride (98 - 107 mmol/L)   107
 
  Carbon Dioxide (22 - 30 mmol/L)   28
 
  Anion Gap (5 - 16)   8
 
  BUN (9 - 20 mg/dL)   19
 
  Creatinine (0.7 - 1.2 mg/dL)   1.0
 
  Estimated GFR (>60 ml/min)   > 60
 
  BUN/Creatinine Ratio (7 - 25 %)   19.0
 
  Glucose (65 - 99 mg/dL)   85
 
  Lactic Acid (0.7 - 2.1 mmol/L) 1.5  
 
  Calcium (8.4 - 10.2 mg/dL)   8.2  L
 
  Total Bilirubin (0.2 - 1.3 mg/dL)   0.7
 
  AST (17 - 59 U/L)   35
 
  ALT (21 - 72 U/L)   44
 
  Alkaline Phosphatase (< 127 U/L)   76
 
  Troponin I (<0.11 ng/ml) 0.04  0.04
 
  Pro-B-Natriuretic Pept (<125 pg/mL)   4390  H
 
  Total Protein (6.3 - 8.2 g/dL)   6.2  L
 
  Albumin (3.5 - 5.0 g/dL)   3.5
 
  Globulin (1.9 - 4.2 gm/dL)   2.7
 
  Albumin/Globulin Ratio (1.1 - 2.2 %)   1.3
 
Coagulation   
 
  PT (9.4 - 12.5 SEC)  11.5 
 
  INR (0.90 - 1.17)  1.10 
 
  APTT (25 - 37 SEC)  34 
 
Hematology   
 
  CBC w Diff   NO MAN DIFF REQ
 
  WBC (4.8 - 10.8 /CUMM)   4.0  L
 
  RBC (4.70 - 6.10 /CUMM)   4.58  L
 
  Hgb (14.0 - 18.0 G/DL)   13.1  L
 
  Hct (42 - 52 %)   39.8  L
 
  MCV (80.0 - 94.0 FL)   86.8
 
  MCH (27.0 - 31.0 PG)   28.7
 
  RDW (11.5 - 14.5 %)   14.6  H
 
  Plt Count (130 - 400 /CUMM)   115  L
 
  MPV (7.4 - 10.4 FL)   8.3
 
  Gran % (42.2 - 75.2 %)   69.6
 
  Lymphocytes % (20.5 - 51.1 %)   17.1  L
 
  Monocytes % (1.7 - 9.3 %)   9.2
 
  Eosinophils % (0 - 5 %)   4.0
 
  Basophils % (0.0 - 2.0 %)   0.1
 
  Absolute Granulocytes (1.4 - 6.5 /CUMM)   2.7
 
  Absolute Lymphocytes (1.2 - 3.4 /CUMM)   0.7  L
 
  Absolute Monocytes (0.10 - 0.60 /CUMM)   0.4
 
  Absolute Eosinophils (0.0 - 0.7 /CUMM)   0.2
 
  Absolute Basophils (0.0 - 0.2 /CUMM)   0
 
  PUBS MCHC (33.0 - 37.0 G/DL)   33.1
 
 
 
 
Diagnostic Data
EKG Results
EKG tracing is independently reviewed, and reveals atrial fibrillation with 
ventricular response of 107, premature ventricular complexes, right bundle 
branch block, left anterior fascicular block, possible lateral infarct, old
CXR Results
Chest x-ray:
Low lung volume bilaterally with interval development of subtle airspace
disease at right lung base, may represent hypoventilatory, atelectatic
changes versus subtle developing pneumonia. Followup radiograph to resolution
is recommended. No other significant change since 12/13/2016.
Other Results
Echocardiogram 10/29/16:
 1. This was a technically difficult and very limited study due to  
 the patient's body habitus. 
 2. The aortic valve was not well visualized.  By doppler criteria,  
 there is no evidence of valvular stenosis. 
 3. Significant thickening and calcification of the mitral leaflets  
 is present with moderate to severe anular calcification.  THere is  
 moderate mitral stenosis present with an estimated valve area of 1.0  
 cm2 with minimal to mild mitral insufficiency and mild tomoderate  
 left atrial enlargement. 
 4. No significant pericardial fluid was detected. 
 5. The left ventricular chamber size appears normal.  Accurate wall  
 motion assessment was not possible.  THe ejection fraction appears  
 normal.  A followup study with the administration of IV contrast  
 might be useful in this patient.  MIld diastolic dysfunction is  
 present. 
 6. THe right heart structures were not optimally assessed.  The RV  
 systolic pressure could not be assessed on this examination. 
 
Assessment/Plan
Assessment/Plan
Assessment:
1.  CAD, status post CABG
2.  History of moderate mitral stenosis
3.  Community acquired pneumonia and COPD exacerbation
4.  New-onset atrial fibrillation
 
Recommendations:
* IV heparin per protocol
* Check serial troponin to rule out myocardial infarction
* Agree with IV antibiotics
* Continue.  Metoprolol for rate control
 
 
 
Consult Acknowledgment
- Thank you for your consult request.

## 2017-02-12 VITALS — SYSTOLIC BLOOD PRESSURE: 106 MMHG | DIASTOLIC BLOOD PRESSURE: 60 MMHG

## 2017-02-12 VITALS — SYSTOLIC BLOOD PRESSURE: 108 MMHG | DIASTOLIC BLOOD PRESSURE: 76 MMHG

## 2017-02-12 LAB
ABSOLUTE GRANULOCYTE CT: 3.8 /CUMM (ref 1.4–6.5)
APTT BLD: 54 SEC (ref 25–37)
APTT BLD: 74 SEC (ref 25–37)
BASOPHILS # BLD: 0 /CUMM (ref 0–0.2)
BASOPHILS NFR BLD: 0.3 % (ref 0–2)
EOSINOPHIL # BLD: 0 /CUMM (ref 0–0.7)
EOSINOPHIL NFR BLD: 0.6 % (ref 0–5)
ERYTHROCYTE [DISTWIDTH] IN BLOOD BY AUTOMATED COUNT: 14.5 % (ref 11.5–14.5)
GRANULOCYTES NFR BLD: 70.6 % (ref 42.2–75.2)
HCT VFR BLD CALC: 37.8 % (ref 42–52)
LYMPHOCYTES # BLD: 0.9 /CUMM (ref 1.2–3.4)
MCH RBC QN AUTO: 29 PG (ref 27–31)
MCHC RBC AUTO-ENTMCNC: 32.8 G/DL (ref 33–37)
MCV RBC AUTO: 88.5 FL (ref 80–94)
MONOCYTES # BLD: 0.6 /CUMM (ref 0.1–0.6)
PLATELET # BLD: 112 /CUMM (ref 130–400)
PMV BLD AUTO: 8.8 FL (ref 7.4–10.4)
RED BLOOD CELL CT: 4.28 /CUMM (ref 4.7–6.1)
WBC # BLD AUTO: 5.3 /CUMM (ref 4.8–10.8)

## 2017-02-12 NOTE — PN- HOUSESTAFF
CARLI LORENZO,[facility] 17 1118:
Subjective
Follow-up For:
New onset atrial fibrillation
Pneumonia
Tele-Events Since Last Visit:
HANSEL vides heart rate 73-98
Subjective:
Patient was seen and examined today, he is alert oriented to place and person.  
He reported cough with green sputum, culture was sent this morning.  He denied 
any chest pain, shortness of breath, diaphoresis, lightheadedness.  No overnight
events reported by the nurse or the patient.  Vital signs are stable
 
Review of Systems
Constitutional:
Reports: see HPI. 
 
Objective
Last 24 Hrs of Vital Signs/I&O
 Vital Signs
 
 
Date Time Temp Pulse Resp B/P Pulse O2 O2 Flow FiO2
 
     Ox Delivery Rate 
 
 1134     98 Nasal 2.0L 
 
      Cannula  
 
 0942  65  110/62    
 
 0842 98.4 76 20 108/76 97 Nasal 2.0L 
 
      Cannula  
 
 0800      Nasal 2.0L 
 
      Cannula  
 
 0000      Nasal 2.0L 
 
      Cannula  
 
 2259      Nasal 2.0L 
 
      Cannula  
 
 2200 98.5 85 20 100/80 95 Nasal 2.0L 
 
      Cannula  
 
 1621 97.5 72 18 126/70 95 Room Air  
 
 1511     97 Nasal 2.0L 
 
      Cannula  
 
 1431    120/84    
 
 
 Intake & Output
 
 
  1600  0800  0000
 
Intake Total  100 100
 
Output Total  500 900
 
Balance  -400 -800
 
    
 
Intake, Oral  100 100
 
Output, Urine  500 900
 
Patient  140.84 kg 
 
Weight   
 
 
 
 
Physical Exam
General Appearance: Alert, oriented 2
Skin: psoriatic lesions bilateral lower extremity
HEENT: Atraumatic, PERRLA, EOMI, Mucous Membr. moist/pink
Cardiovascular: irregular irregular
Lungs: Clear to Auscultation, Normal Air Movement
Abdomen: Normal Bowel Sounds, Soft, No Tenderness
Neurological: Normal Speech, Strength at 5/5 X4 Ext, Normal Tone, Sensation 
Intact, Cranial Nerves 3-12 NL, Reflexes 2+
Extremities: No Clubbing, No Cyanosis, No Edema, Normal Pulses
 
Assessment/Plan
Assessment:
This is 84 YO male with past medical history of CAD S/P CABG, aortic stenosis, 
psoriasis, osteoarthritis, gout, MS,COPD not on home oxygen ( need to confirm 
with niece came in today from home after chief complaints of shortness of breath
and cought with greenish sputum and substernal chest discomfort.
 
CXR : Low lung volume bilaterally with interval development of subtle airspace
disease at right lung base, may represent hypoventilatory, atelectatic
changes versus subtle developing pneumonia. Followup radiograph to resolution
is recommended.
 
 
Problem list 
#1 substernal Chest discomfort/ rule out ACS
* Patient denied any chest pain, shortness of breath today 
* Cardiology consultation was obtained, thanks for recommendation 
* Continue IV heparin 
* Troponin trended down
* Continue aspirin 81 daily
 
#2 Suspected Right Lower Lobe pnuemonia-
* Temperature 98.4 Tmax 97.5 
* No leukocytosis 
* Culture pending 
* Ct azithromycin iv and ceftrixone for now, if cultures are negatvie and Mr. Gonzalez is stable, consider DC AX
* Lactic acid trended down
 
 
#3 Atrial Flutter v/s fib?
* New onset A. fib, heart rate 51-89 
* Ct iv heparin
* Continue metoprolol succinate 25 mg by mouth daily 
 
 
#4 COPD Exacerbation
* Ct TRC mebulisations.
* 2 L with 97% saturation 
* Prednisone 60 mg daily, ct to taper
* Ct home inhalers
 
#5 Hyperlipidemia
* Atorvastatin 40 daily
 
#6 Psoriasis- 
* Ct hoem medication, calciptreione, triamcinolone for local application
 
#7 Lower extremtiy edema
* Ct lasix 20 mg Daily.
 
 
FC
DVT px : iv heaprin
heart healthy diet
Problem List:
 1. New onset atrial flutter
 
 2. Chest pain
 
Pain Ratin
Pain Location:
None
Pain Goal: Pain 4 or less
Pain Plan:
Mild pain pathway
Tomorrow's Labs & Rationales:
CBC ALEXANDRE ROSS MD,AMARIS 17 1252:
Attending MD Review Statement
 
Attending Statement
Attending MD Statement: examined this patient, discuss w/resident/PA/NP, agreed 
w/resident/PA/NP, reviewed EMR data (avail), amended to note
Attending Assessment/Plan:
The patient was seen and discussed with house staff. Troponins negative thus far
, no chest pain today. Afib on monitor with rate controlled. Appreciate 
Cardiology follow-up. Continue care as outlined.

## 2017-02-12 NOTE — PN- CARDIOLOGY
Subjective
Subjective:
Feeling well.  No current chest pain.  No shortness breath.  No palpitations.  
He remains in atrial fibrillation with rate under control.
 
Objective
Vital Signs and I&Os
Vital Signs
 
 
Date Time Temp Pulse Resp B/P Pulse O2 O2 Flow FiO2
 
     Ox Delivery Rate 
 
02/12 1134     98 Nasal 2.0L 
 
      Cannula  
 
02/12 0942  65  110/62    
 
02/12 0842 98.4 76 20 108/76 97 Nasal 2.0L 
 
      Cannula  
 
02/12 0800      Nasal 2.0L 
 
      Cannula  
 
02/12 0000      Nasal 2.0L 
 
      Cannula  
 
02/11 2259      Nasal 2.0L 
 
      Cannula  
 
02/11 2200 98.5 85 20 100/80 95 Nasal 2.0L 
 
      Cannula  
 
02/11 1621 97.5 72 18 126/70 95 Room Air  
 
02/11 1511     97 Nasal 2.0L 
 
      Cannula  
 
02/11 1431    120/84    
 
 
 Intake & Output
 
 
 02/12 1600 02/12 0800 02/12 0000 02/11 1600 02/11 0800 02/11 0000
 
Intake Total  100 100 250  
 
Output Total  500 900 400  
 
Balance  -400 -800 -150  
 
       
 
Intake, Oral  100 100 250  
 
Output, Urine  500 900 400  
 
Patient  311 lb  319 lb  
 
Weight      
 
 
 
Physical Exam:
Gen: The patient is in no acute distress
HEENT: Normal nose, ears, and oropharynx.  Pupils equal bilaterally.  
Conjunctiva normal.
Neck: Supple with no JVD, no masses, and no thyromegaly
Lungs: Bilateral wheeze, scattered rhonchi with normal respiratory effort
Heart: Irregularly irregular, S1, S2, no murmurs.  No peripheral edema, 2+ 
pulses in the lower extremities bilaterally
Abdomen:  Soft, nontender, no masses.  No hepatomegaly.  No splenomegaly
Extremities: No clubbing or cyanosis.  Normal muscle strength in the upper and 
lower extremities
Skin: Normal skin turgor with no skin ulcers or lesions noted.
Neuro: Cranial nerves intact.  Sensation intact
Current Medications:
 Current Medications
 
 
  Sig/Angela Start time  Last
 
Medication Dose Route Stop Time Status Admin
 
Acetaminophen 650 MG Q6 PRN 02/11 1330 AC 
 
  PO   
 
Albuterol Sulfate 3 ML Q4P PRN 02/11 2315 AC 02/12
 
  INH   1130
 
Aspirin Buffered 81 MG DAILY 02/12 1000 AC 02/12
 
  PO   0941
 
Atorvastatin Calcium 40 MG DAILY 02/12 1000 AC 02/12
 
  PO   0942
 
Azithromycin 500 MG DAILY@1230 02/12 1230 AC 02/12
 
Dextrose/Water 250 ML IV   1239
 
Ceftriaxone Sodium 1,000 MG DAILY@1230 02/12 1230 AC 02/12
 
  IV   1239
 
Enoxaparin Sodium 30 MG DAILY 02/11 1327 DC 
 
  SC   
 
Folic Acid 1 MG DAILY 02/12 1000 AC 02/12
 
  PO   0942
 
Furosemide 20 MG DAILY 02/12 1000 AC 02/12
 
  PO   0942
 
Heparin Sodium  2,300 UNIT ONCE ONE 02/12 1140 DC 02/12
 
(Porcine)  IV 02/12 1141  1238
 
Heparin Sodium/ 25,000 UNIT Q24H 02/11 1345 AC 02/11
 
Dextrose  IV   1427
 
Dextrose/Water 500 ML    
 
Metoprolol Succinate 25 MG DAILY 02/12 1000 AC 02/12
 
  PO   0942
 
Oxycodone HCl 5 MG Q6P PRN 02/11 1330 AC 
 
  PO   
 
Oxycodone/ 2 TAB Q6P PRN 02/11 1330 AC 
 
Acetaminophen  PO   
 
Prednisone 60 MG DAILY 02/12 1000 AC 02/12
 
  PO   0942
 
Triamcinolone  1 TATO TIDPRN PRN 02/12 0130 AC 
 
Acetonide  TOP   
 
 
 
 
Results
Last 48 Hrs of Labs/Mics:
 Laboratory Tests
 
02/12/17 0857:
APTT 54  H
 
02/12/17 0742:
Anion Gap 5, Estimated GFR > 60, BUN/Creatinine Ratio 21.0, Lactic Acid 0.9, CBC
w Diff NO MAN DIFF REQ, RBC 4.28  L, MCV 88.5, MCH 29.0, RDW 14.5, MPV 8.8, Gran
% 70.6, Lymphocytes % 17.4  L, Monocytes % 11.1  H, Eosinophils % 0.6, Basophils
% 0.3, Absolute Granulocytes 3.8, Absolute Lymphocytes 0.9  L, Absolute 
Monocytes 0.6, Absolute Eosinophils 0, Absolute Basophils 0, PUBS MCHC 32.8  L
 
02/11/17 2045:
APTT 71  H
 
02/11/17 1815:
Troponin I 0.03
 
02/11/17 1815:
Lactic Acid 3.0  H
 
02/11/17 1455:
Urine Color STRAW, Urine Clarity CLEAR, Urine pH 6.0, Ur Specific Gravity <= 
1.005, Urine Protein NEG, Urine Ketones NEG, Urine Nitrite NEG, Urine Bilirubin 
NEG, Urine Urobilinogen 1.0, Ur Leukocyte Esterase TRACE  H, Ur Microscopic 
SEDIMENT EXAMINED, Urine WBC RARE, Ur Epithelial Cells RARE, Urine Hemoglobin 
NEG, Urine Glucose NEG
 
02/11/17 1444:
Lactic Acid 1.5, Troponin I 0.04
 
02/11/17 1111:
PT 11.5, INR 1.10, APTT 34
 
02/11/17 1100:
Anion Gap 8, Estimated GFR > 60, BUN/Creatinine Ratio 19.0, Glucose 85, Calcium 
8.2  L, Total Bilirubin 0.7, AST 35, ALT 44, Alkaline Phosphatase 76, Troponin I
0.04, Pro-B-Natriuretic Pept 4390  H, Total Protein 6.2  L, Albumin 3.5, 
Globulin 2.7, Albumin/Globulin Ratio 1.3, CBC w Diff NO MAN DIFF REQ, RBC 4.58  
L, MCV 86.8, MCH 28.7, RDW 14.6  H, MPV 8.3, Gran % 69.6, Lymphocytes % 17.1  L,
Monocytes % 9.2, Eosinophils % 4.0, Basophils % 0.1, Absolute Granulocytes 2.7, 
Absolute Lymphocytes 0.7  L, Absolute Monocytes 0.4, Absolute Eosinophils 0.2, 
Absolute Basophils 0, PUBS MCHC 33.1
 
 
Assessment/Plan
Assessment/Plan
Assessment:
1.  CAD, status post CABG
2.  History of moderate mitral stenosis
3.  History of bioprosthetic aortic valve
4.  Community acquired pneumonia and COPD exacerbation
5.  New-onset atrial fibrillation
 
Recommendations:
* IV heparin per protocol.  Change to oral anticoagulation (NOAC or warfarin) 
prior to discharge
* Myocardial infarction ruled out with negative troponin 3
* Agree with IV antibiotics
* Continue PO metoprolol for rate control.
Continue telemetry? Yes

## 2017-02-13 VITALS — SYSTOLIC BLOOD PRESSURE: 120 MMHG | DIASTOLIC BLOOD PRESSURE: 68 MMHG

## 2017-02-13 VITALS — SYSTOLIC BLOOD PRESSURE: 112 MMHG | DIASTOLIC BLOOD PRESSURE: 70 MMHG

## 2017-02-13 VITALS — DIASTOLIC BLOOD PRESSURE: 70 MMHG | SYSTOLIC BLOOD PRESSURE: 122 MMHG

## 2017-02-13 VITALS — SYSTOLIC BLOOD PRESSURE: 100 MMHG | DIASTOLIC BLOOD PRESSURE: 70 MMHG

## 2017-02-13 LAB
ABSOLUTE GRANULOCYTE CT: 4.7 /CUMM (ref 1.4–6.5)
APTT BLD: 65 SEC (ref 25–37)
BASOPHILS # BLD: 0 /CUMM (ref 0–0.2)
BASOPHILS NFR BLD: 0.3 % (ref 0–2)
EOSINOPHIL # BLD: 0 /CUMM (ref 0–0.7)
EOSINOPHIL NFR BLD: 0 % (ref 0–5)
ERYTHROCYTE [DISTWIDTH] IN BLOOD BY AUTOMATED COUNT: 14.6 % (ref 11.5–14.5)
GRANULOCYTES NFR BLD: 79.4 % (ref 42.2–75.2)
HCT VFR BLD CALC: 39.1 % (ref 42–52)
LYMPHOCYTES # BLD: 0.8 /CUMM (ref 1.2–3.4)
MCH RBC QN AUTO: 29 PG (ref 27–31)
MCHC RBC AUTO-ENTMCNC: 32.9 G/DL (ref 33–37)
MCV RBC AUTO: 88.1 FL (ref 80–94)
MONOCYTES # BLD: 0.4 /CUMM (ref 0.1–0.6)
PLATELET # BLD: 116 /CUMM (ref 130–400)
PMV BLD AUTO: 9.4 FL (ref 7.4–10.4)
PROTHROMBIN TIME: 11.4 SEC (ref 9.4–12.5)
RED BLOOD CELL CT: 4.44 /CUMM (ref 4.7–6.1)
WBC # BLD AUTO: 6 /CUMM (ref 4.8–10.8)

## 2017-02-13 NOTE — PN- HOUSESTAFF
JAKI LORENZO,EDGARD 17 0732:
Subjective
Follow-up For:
New onset atrial fibrillation
Pneumonia
Complaints: complaining of difficulty in the breathing
Tele-Events Since Last Visit:
Atrial flutter and fibrillation, bundle branch block, PVCs, trigeminy, heart 
rate between 60-70
Subjective:
Patient is seen and examined at the bedside. He was complaining of difficulty in
breathing, especially during inspiration. He feels the air is stucking in his 
chest and he is also feeling wheezy. He denies of any chest pain, palpitation, 
bleeding from any site of his body.
 
Review of Systems
Constitutional:
Reports: weakness.  Denies: chills, diaphoresis, fever, malaise. 
EENTM:
Denies: no symptoms. 
Cardiovascular:
Denies: chest pain, edema, orthopena, palpitations, peripheral edema. 
Respiratory:
Reports: short of breath, wheezing.  Denies: cough, hemoptysis, orthopnea, 
sputum production, stridor. 
Gastrointestinal:
Denies: abdominal pain, bloating, constipation, diarrhea, distention, bowel 
incontinence, melena, nausea, bloody stool. 
Genitourinary:
Denies: no symptoms. 
Musculoskeletal:
Reports: back pain. 
Skin:
Denies: no symptoms. 
Neurological/Psychological:
Reports: anxiety, depressed. 
 
Objective
Last 24 Hrs of Vital Signs/I&O
 Vital Signs
 
 
Date Time Temp Pulse Resp B/P Pulse O2 O2 Flow FiO2
 
     Ox Delivery Rate 
 
2053     97 Nasal 2.0L 
 
      Cannula  
 
 1600      Nasal 2.0L 
 
      Cannula  
 
 1533 97.7 72 20 122/70 96 Nasal 2.0L 
 
      Cannula  
 
 1003  74  112/70    
 
 0929     96 Nasal 2.0L 
 
      Cannula  
 
 0813 97.7 74 20 112/70 97 Nasal 2.0L 
 
      Cannula  
 
 0006 98.2 67 20 100/70 94 Room Air  
 
 0000      Nasal 2.0L 
 
      Cannula  
 
 
 Intake & Output
 
 
  1600  0800  0000
 
Intake Total 480 100 100
 
Output Total 700  150
 
Balance -220 100 -50
 
    
 
Intake, Oral 480 100 100
 
Output, Urine 700  150
 
Patient  141.181 kg 
 
Weight   
 
 
 
 
Physical Exam
General Appearance: Alert, Oriented X3, Cooperative, No Acute Distress
Skin: No Rashes, No Breakdown
HEENT: Atraumatic, PERRLA, EOMI
Neck: Supple, No JVD
Cardiovascular: Normal S1, Normal S2, irregular
Lungs: wheezing at the right side of upper chest
Abdomen: Soft, No Tenderness
Neurological: Normal Speech
Extremities: No Clubbing, No Cyanosis, No Edema
Vascular: Normal Pulses, Pulses Symmetrical
 
Assessment/Plan
Assessment:
Patient is a 82 YO male with past medical history of CAD S/P CABG, aortic 
stenosis, psoriasis, osteoarthritis, gout, MS,COPD not on home oxygen with chief
complaints of shortness of breath and cought with greenish sputum and substernal
chest discomfort.
 
CXR : Low lung volume bilaterally with interval development of subtle airspace 
disease at right lung base, may represent hypoventilatory, atelectatic changes 
versus subtle developing pneumonia. Followup radiograph to resolution is 
recommended.
 
Chest x-ray 2017-does not shows any signs of pneumonia
 
Problem list 
New onset atrial fibrillation
COPD
Hyperlipidemia
Psoriasis
 
Plan-
* We will stop IV heparin drip
* We will start patient on tab Eliquis 5 mgs PO twice a day
* We will stop injection ceftriaxone and azithromycin
* We will continue tab prednisone and taper it rapidly
* We will continue aspirin 81 daily
* We'll continue tablet Atorvastatin 40 daily
* We will continue home medication, calciptreione, triamcinolone for local 
application
* We'll continue tablet lasix 20 mg Daily.
* We will continue tablet metoprolol succinate 25 mg by mouth daily 
* TRC/nebulization
* We will get the oxygen to keep SPO2 more than 92%
* CODE STATUS-full code
* DVT prophylaxis-ALP S/heparin
* Diet-heart healthy diet
 
Problem List:
 1. New onset atrial flutter
 
 2. Chest pain
 
 3. HLD (hyperlipidemia)
 
 4. HTN (hypertension)
 
 5. Psoriasis
 
 6. COPD (chronic obstructive pulmonary disease)
 
Pain Ratin
Pain Location:
Not applicable
Pain Goal: Remain pain free
Pain Plan:
Mild
Tomorrow's Labs & Rationales:
none
DVT/Prophylaxis: mechanical, pharmacological
 
 
SPENSER SMITH MD 17 1200:
Attending MD Review Statement
 
Attending Statement
Attending MD Statement: examined this patient, discuss w/resident/PA/NP, agreed 
w/resident/PA/NP, reviewed EMR data (avail)
Attending Assessment/Plan:
Patient still reports SOB and weakness on standing.  His cough is persistent but
improved, and he is producing thick yellow sputum.  Afebrile, stable vitals.  HR
is now controlled.
 
Plan
- Discontinue heparin
- Start Eliquis
- Continue rate control
- Discontinue Ceftriaxone
- Continue Azithromycin
- Decrease Prednisone to 40mg daily
- Continue breathing treatments
- Contineu home medications
- PT evaluation
- Anticipated discharge tomorrow

## 2017-02-13 NOTE — PN- CARDIOLOGY
Subjective
Subjective:
Carlos Gonzalez is an 83-year-old man who I first met in 01/2012, referred from 
[facility] because of aortic stenosis identified on echocardiogram. His echo in
2012 showed a gradient of 55 mg peak and 32 mm of mean. He was followed along 
and developed shortness of breath in late 2012 which we thought was related to 
his aortic valve disease. He had cardiac catheterization in 10/2012, which 
showed severe aortic stenosis and single vessel LAD disease. He was referred to 
Dr. Ragland who did bioprosthetic aortic valve replacement and LAD graft and he 
ultimately did well. He had a saphenous vein graft to the LAD.
 
Subsequently Carlos has done pretty well. He has been basically asymptomatic 
from a cardiac standpoint. His echo in 03/2013 showed only a mild gradient 
across the valve of about 10 mmHg mean with good left ventricular systolic 
function.  We repeated his echocardiogram in September 2016, which now showed 
moderate aortic stenosis on his bioprosthetic valve with a peak gradient of 46 
mmHg and a mean gradient of 22 mmHg.  He was also having some ventricular ectopy
which was asymptomatic.  However he's never had atrial fibrillation previously. 
The patient was admitted now with respiratory symptoms and was found to be in 
atrial fibrillation/flutter.  He had a previous admission in October 2016 for 
chest pain which was negative.  He was in sinus rhythm on that admission.  He is
on low-dose metoprolol with a controlled rate.  He is also having some PVCs.  
However, these are asymptomatic.
 
Objective
Vital Signs and I&Os
Vital Signs
 
 
Date Time Temp Pulse Resp B/P Pulse O2 O2 Flow FiO2
 
     Ox Delivery Rate 
 
02/13 0813 97.7 74 20 112/70 97 Nasal 2.0L 
 
      Cannula  
 
02/13 0006 98.2 67 20 100/70 94 Room Air  
 
02/13 0000      Nasal 2.0L 
 
      Cannula  
 
02/12 2131     95 Nasal 2.0L 
 
      Cannula  
 
02/12 1616 98.6 70 20 106/60 95 Nasal 2.0L 
 
      Cannula  
 
02/12 1134     98 Nasal 2.0L 
 
      Cannula  
 
02/12 0942  65  110/62    
 
 
 Intake & Output
 
 
 02/13 1600 02/13 0800 02/13 0000 02/12 1600 02/12 0800 02/12 0000
 
Intake Total  100 100 730 100 100
 
Output Total   150 650 500 900
 
Balance  100 -50 80 -400 -800
 
       
 
Intake, IV    250  
 
Intake, Oral  100 100 480 100 100
 
Output, Urine   150 650 500 900
 
Patient  311 lb   311 lb 
 
Weight      
 
 
 
Physical Exam:
He is in no distress sitting in a chair.
 
HEENT exam is normal
Chest reveals a few coarse rales in the bases and some very mild expiratory 
wheezing
Heart reveals irregular rhythm with a grade 2 to 3/6 systolic ejection murmur at
the base
Abdomen is benign
Extremities reveal trace edema and chronic stasis skin changes.
Current Medications:
 Current Medications
 
 
  Sig/Angela Start time  Last
 
Medication Dose Route Stop Time Status Admin
 
Acetaminophen 650 MG Q6 PRN 02/11 1330 AC 
 
  PO   
 
Albuterol Sulfate 3 ML Q4P PRN 02/11 2315 AC 02/12
 
  INH   1130
 
Aspirin Buffered 81 MG DAILY 02/12 1000 AC 02/12
 
  PO   0941
 
Atorvastatin Calcium 40 MG DAILY 02/12 1000 AC 02/12
 
  PO   0942
 
Azithromycin 500 MG DAILY@1230 02/12 1230 AC 02/12
 
Dextrose/Water 250 ML IV   1239
 
Ceftriaxone Sodium 1,000 MG DAILY@1230 02/12 1230 AC 02/12
 
  IV   1239
 
Folic Acid 1 MG DAILY 02/12 1000 AC 02/12
 
  PO   0942
 
Furosemide 20 MG DAILY 02/12 1000 AC 02/12
 
  PO   0942
 
Heparin Sodium  2,300 UNIT ONCE ONE 02/12 1140 DC 02/12
 
(Porcine)  IV 02/12 1141  1238
 
Heparin Sodium/ 25,000 UNIT Q24H 02/11 1345 AC 02/13
 
Dextrose  IV   0530
 
Dextrose/Water 500 ML    
 
Metoprolol Succinate 25 MG DAILY 02/12 1000 AC 02/12
 
  PO   0942
 
Oxycodone HCl 5 MG Q6P PRN 02/11 1330 AC 
 
  PO   
 
Oxycodone/ 2 TAB Q6P PRN 02/11 1330 AC 
 
Acetaminophen  PO   
 
Prednisone 60 MG DAILY 02/12 1000 AC 02/12
 
  PO   0942
 
Triamcinolone  1 TATO TIDPRN PRN 02/12 0130 AC 
 
Acetonide  TOP   
 
 
 
 
Results
Last 48 Hrs of Labs/Mics:
 Laboratory Tests
 
02/13/17 0635:
Sodium Pending, Potassium Pending, Chloride Pending, Carbon Dioxide Pending, 
Anion Gap Pending, BUN Pending, Creatinine Pending, BUN/Creatinine Ratio Pending
, APTT Pending, CBC w Diff Pending, WBC Pending, RBC Pending, Hgb Pending, Hct 
Pending, MCV Pending, MCH Pending, RDW Pending, Plt Count Pending, MPV Pending, 
PUBS MCHC Pending
 
02/12/17 1725:
APTT 74  H
 
02/12/17 0857:
APTT 54  H
 
02/12/17 0742:
Anion Gap 5, Estimated GFR > 60, BUN/Creatinine Ratio 21.0, Lactic Acid 0.9, CBC
w Diff NO MAN DIFF REQ, RBC 4.28  L, MCV 88.5, MCH 29.0, RDW 14.5, MPV 8.8, Gran
% 70.6, Lymphocytes % 17.4  L, Monocytes % 11.1  H, Eosinophils % 0.6, Basophils
% 0.3, Absolute Granulocytes 3.8, Absolute Lymphocytes 0.9  L, Absolute 
Monocytes 0.6, Absolute Eosinophils 0, Absolute Basophils 0, [facility] MCHC 32.8  L
 
02/11/17 2045:
APTT 71  H
 
02/11/17 1815:
Troponin I 0.03
 
02/11/17 1815:
Lactic Acid 3.0  H
 
02/11/17 1455:
Urine Color STRAW, Urine Clarity CLEAR, Urine pH 6.0, Ur Specific Gravity <= 
1.005, Urine Protein NEG, Urine Ketones NEG, Urine Nitrite NEG, Urine Bilirubin 
NEG, Urine Urobilinogen 1.0, Ur Leukocyte Esterase TRACE  H, Ur Microscopic 
SEDIMENT EXAMINED, Urine WBC RARE, Ur Epithelial Cells RARE, Urine Hemoglobin 
NEG, Urine Glucose NEG
 
02/11/17 1444:
Lactic Acid 1.5, Troponin I 0.04
 
02/11/17 1111:
PT 11.5, INR 1.10, APTT 34
 
02/11/17 1100:
Anion Gap 8, Estimated GFR > 60, BUN/Creatinine Ratio 19.0, Glucose 85, Calcium 
8.2  L, Total Bilirubin 0.7, AST 35, ALT 44, Alkaline Phosphatase 76, Troponin I
0.04, Pro-B-Natriuretic Pept 4390  H, Total Protein 6.2  L, Albumin 3.5, 
Globulin 2.7, Albumin/Globulin Ratio 1.3, CBC w Diff NO MAN DIFF REQ, RBC 4.58  
L, MCV 86.8, MCH 28.7, RDW 14.6  H, MPV 8.3, Gran % 69.6, Lymphocytes % 17.1  L,
Monocytes % 9.2, Eosinophils % 4.0, Basophils % 0.1, Absolute Granulocytes 2.7, 
Absolute Lymphocytes 0.7  L, Absolute Monocytes 0.4, Absolute Eosinophils 0.2, 
Absolute Basophils 0, PUBS MCHC 33.1
 
 
Assessment/Plan
Assessment/Plan
Carlos presents with probable pneumonia and also was found to have "new onset" 
atrial fibrillation and ventricular ectopy with controlled response.  He is now 
on IV heparin.  He is on oral metoprolol.  His rate is within the normal range. 
I think based on his previous echoes etc. it is unlikely Carlos will be able to 
be gotten back to sinus rhythm.
 
 I recommend stopping heparin and starting him on Eliquis 5 mg twice daily.  
Hopefully we'll be able to take this in addition to his other medications when 
he goes home.  Apparently his niece does pour his meds for him and and we will 
have to give her instructions regarding this.
Continue telemetry? Yes

## 2017-02-13 NOTE — RADIOLOGY REPORT
EXAMINATION:
XR CHEST
 
CLINICAL INFORMATION:
Right lung base pneumonia. Need to confirm. Shortness of breath.
 
COMPARISON:
Several prior chest x-rays, most recent of which is dated 02/11/2017.
 
TECHNIQUE:
AP semierect sitting and lateral views of the chest were obtained.
 
FINDINGS:
The patient is status post median sternotomy and aortic valve replacement.
The cardiomediastinal silhouette is enlarged, unchanged. Mitral annular
calcifications are noted.
 
No evolving consolidation is seen in the medial right lung base. There are
persistent minimal linear opacities, most likely related to subsegmental
atelectasis and volume loss. Remainder of lungs clear. No effusion or
pneumothorax.
 
Moderate vertebral endplate spurring again seen in mid and lower thoracic
spine.
 
IMPRESSION:
 
1. No evidence of evolving pneumonia.
2. Minimal linear opacities in medial right lung base most consistent with
subsegmental atelectasis and volume loss. Clinical correlation requested.

## 2017-02-14 VITALS — SYSTOLIC BLOOD PRESSURE: 126 MMHG | DIASTOLIC BLOOD PRESSURE: 70 MMHG

## 2017-02-14 NOTE — PATIENT DISCHARGE INSTRUCTIONS
Discharge Instructions
 
General Discharge Information
You were seen/treated for:
rapid heart rate, leding to shortness of breath - atrial fibrillation
Special Instructions:
please follow up with your cardiologist with in a week of discharge.
please take medication as advised
please follow up with PCP with in a week of discharge
 
Diet
Recommended Diet: Heart Healthy
 
Activity
Full Activity/No Limits: No (as tolerated)
Activity Self Limited: No
 
Acute Coronary Syndrome
 
Inclusion Criteria
At DC or during hospital stay patient has or had the following:
ACS DIAGNOSIS No
 
Discharge Core Measures
Meds if any: Prescribed or Continued at Discharge
Meds if any: NOT Prescribed or Continued at Discharge
 
Congestive Heart Failure
 
Inclusion Criteria
At DC or during hospital stay patient has or had the following:
CHF DIAGNOSIS No
 
Discharge Core Measures
Meds if any: Prescribed or Continued at Discharge
Meds if any: NOT Prescribed or Continued at Discharge
 
Cerebrovascular accident
 
Inclusion Criteria
At DC or during hospital stay patient has or had the following:
CVA/TIA Diagnosis No
 
Discharge Core Measures
Meds if any: Prescribed or Continued at Discharge
Meds if any: NOT Prescribed or Continued at Discharge
 
Venous thromboembolism
 
Inclusion Criteria
VTE Diagnosis No
VTE Type NONE
VTE Confirmed by (Test) NONE
 
Discharge Core Measures
- Per Current guidelines, there needs to be overlap
- treatment for the first 5 days of Warfarin therapy.
- If discharged on Warfarin prior to 5 days of
- overlap therapy, the patient will need to be
- assessed for post discharge needs including
- *Post discharge parental anticoagulation
- *Warfarin and/or parental anticoagulation education
- *Follow up date to check INR post discharge
At least 5 days overlap therapy as Inpatient No
Meds if any: Prescribed or Continued at Discharge
Note: Overlap Therapy is Warfarin and Anticoagulant
Meds if any: NOT Prescribed or Continued at Discharge

## 2017-02-14 NOTE — PN- HOUSESTAFF
Subjective
Follow-up For:
new onset atrial fibrillation
Tele-Events Since Last Visit:
afib/aflutter  HR 
Subjective:
seen and examined patient, offers no complaints.  Feels well enough to be 
discharged today. 
 
Review of Systems
Constitutional:
Denies: chills, diaphoresis, fever, malaise, weakness, unexplained weight loss. 
Cardiovascular:
Denies: chest pain, edema, orthopena, palpitations, peripheral edema, syncope. 
Respiratory:
Denies: cough, hemoptysis, orthopnea, short of breath, sputum production, 
stridor, wheezing. 
 
Objective
Last 24 Hrs of Vital Signs/I&O
 Vital Signs
 
 
Date Time Temp Pulse Resp B/P Pulse O2 O2 Flow FiO2
 
     Ox Delivery Rate 
 
 0822 97.5 62 20 126/70    
 
 0811 97.5 62 20 126/70 96 Nasal 2.0L 
 
      Cannula  
 
 0800     96 Nasal 2.0L 
 
      Cannula  
 
 0000     96 Nasal 2.0L 
 
      Cannula  
 
 2331 98.1 64 20 120/68 97 Nasal  
 
      Cannula  
 
 2053     97 Nasal 2.0L 
 
      Cannula  
 
 1600      Nasal 2.0L 
 
      Cannula  
 
 1533 97.7 72 20 122/70 96 Nasal 2.0L 
 
      Cannula  
 
 
 Intake & Output
 
 
  1600  0800  0000
 
Intake Total 600 250 300
 
Output Total 1600 500 425
 
Balance -1000 -250 -125
 
    
 
Intake, IV   0
 
Intake, Oral 600 250 300
 
Number  0 0
 
Bowel   
 
Movements   
 
Output, Urine 1600 500 425
 
Patient  308 lb 
 
Weight   
 
 
 
 
Physical Exam
General Appearance: Alert, Oriented X3, Cooperative, No Acute Distress
Cardiovascular: Normal S1, Normal S2
Lungs: Normal Air Movement, b/l wheeze
Extremities: +2 edema
Current Medications:
 Current Medications
 
 
  Sig/Angela Start time  Last
 
Medication Dose Route Stop Time Status Admin
 
Acetaminophen 650 MG Q6 PRN  1330 AC 
 
  PO   
 
Albuterol Sulfate 3 ML Q4P PRN  2315 AC 
 
  INH   0925
 
Apixaban 5 MG BID  1000 CAN 
 
  PO   
 
Apixaban 5 MG Q12  1000 DC 
 
  PO  2201  
 
Apixaban 5 MG Q12H  0600 DC 
 
  PO   
 
Apixaban 5 MG Q12H  0600 AC 
 
  PO   0645
 
Apixaban 5 MG BID  1327 DC 
 
  PO   1610
 
Aspirin Buffered 81 MG DAILY  1000 AC 
 
  PO   0823
 
Atorvastatin Calcium 40 MG DAILY  1000 AC 
 
  PO   0823
 
Folic Acid 1 MG DAILY  1000 AC 
 
  PO   0823
 
Furosemide 20 MG DAILY  1000 AC 
 
  PO   0823
 
Metoprolol Succinate 25 MG DAILY  1000 AC 
 
  PO   0822
 
Oxycodone HCl 5 MG Q6P PRN  1330 AC 
 
  PO   
 
Oxycodone/ 2 TAB Q6P PRN  1330 AC 
 
Acetaminophen  PO   
 
Patient Medication  1 ED .STK-MED ONE  1348 DC 
 
Teaching  ED  1349  
 
Prednisone 40 MG DAILY  1000 AC 
 
  PO   0822
 
Triamcinolone  1 TATO TIDPRN PRN  0130 AC 
 
Acetonide  TOP   
 
 
 
 
Last 24 Hrs of Lab/Dusty Results
Last 24 Hrs of Labs/Mics:
 Laboratory Tests
 
17 1900:
APTT Cancelled
 
 
Assessment/Plan
Assessment:
83 year old gentleman with past medical history of CAD S/P CABG, aortic stenosis
, psoriasis, osteoarthritis, gout, MS,COPD not on home oxygen current admission 
for new onset afib and COPD exacerbation. Clinically improved and saturating 
well on RA.  
 
 
Problem list 
New onset atrial fibrillation
COPD exacerbation
Hyperlipidemia
Psoriasis
 
Plan-
* on Eliquis 5 mgs PO twice a day, gave 30 day coupon to niece and informed her 
about starting eliquis and its adverse effects, advised to follow up with his 
cardiologist within two weeks
* continue  prednisone taper 
* We will continue aspirin 81 daily
* We'll continue tablet Atorvastatin 40 daily
* We will continue home medication, calciptreione, triamcinolone for local 
application
* We'll continue tablet lasix 20 mg Daily.
* We will continue tablet metoprolol succinate 25 mg by mouth daily 
* DVT prophylaxis-ALP S/heparin
* Diet-heart healthy diet
* discharge to home with home services today. 
* full code
 
Problem List:
 1. Atrial fibrillation
 
 2. COPD (chronic obstructive pulmonary disease)
 
Pain Ratin
Pain Location:
na
Pain Goal: Pain 4 or less
Pain Plan:
current regimen
Tomorrow's Labs & Rationales:
none required

## 2017-02-14 NOTE — DISCHARGE SUMMARY
Visit Information
 
Visit Dates
Admission Date:
02/11/17
 
Discharge Date:
02/14/17
 
 
Hospital Course
 
Course
Attending Physician:
SPENSER SMITH MD
 
Primary Care Physician:
ALBERTA ALONZO
 
Hospital Course:
Patient is a 83-year-old male with significant past medical history of 
hypertension, hyperlipidemia, moderate mitral stenosis, history of bioprosthetic
aortic valve replacement and LAD graft,coronary artery disease, status post CABG
, COPD, gout, psoriasis, psoriatic arthritis, presented with chief complaints of
sudden onset of shortness of breath along with cough and expectoration with 
chest pain to the [facility] ED.
 
Vital signs at the time of admission-temperature 90.6, pulse 74, respiratory 
rate 20, blood pressure 129/93, SPO2 98% on room air.
 
EKG showed-atrial fibrillation with PACs.
 
New onset atrial fibrillation -
As the patient presented with chest pain and EKG shows atrial fibrillation. We 
admitted the patient to telemetry and started on IV heparin drip. Also did 
serial EKG and troponins. Serial troponins were negative. We also took 
cardiology consultation and follow the recommendation. After 24 hours. We 
stopped the IV heparin drip and he started patient on the Eliquis 5mg BID. We 
discharged with advice to follow-up with the cardiologist within a week of 
discharge.
 
COPD acute exacerbation, possible right lower lobe pneumonia
Initially, patient was complaining of cough with expectoration and chest x-ray 
showed possible right lower lobe pneumonia so we started patient on ceftriaxone 
and azithromycin, and supported with steroid for COPD. Later follow-up chest x-
ray was clear, without any evidence of pneumonia. So we stopped antibiotic and 
continued patient on tapering doses of prednisone. We discharged him with advice
to follow-up with PCP within a week. 
 
 
Allergies:
Coded Allergies:
NO KNOWN ALLERGIES (05/17/16)
 
 
Disposition Summary
 
Disposition
Principal Diagnosis:
New onset atrial fibrillation
COPD acute exacerbation
Additional Diagnosis:
Hyperlipidemia
Hypertension
Moderate mitral stenosis
CAD (s/p CABG), 
COPD 
Psoriasis 
Gout 
Discharge Disposition: home or self care
 
Discharge Instructions
 
General Discharge Information
Code Status: Full Code
Patient's Diet:
Heart healthy diet
Patient's Activity:
As tolerated
Follow-Up Instructions/Appts:
please follow up with your cardiologist with in a week of discharge.
please take medication as advised
please follow up with PCP with in a week of discharge
 
 
Medications at Discharge
Discharge Medications:
Continue taking these medications:
Aspirin (Ecotrin) 81 MG TABLET.DR
    1 Tablet ORAL DAILY
 
Simvastatin (Simvastatin) 40 MG TABLET
    1 Tablet ORAL Every night
    Comments:
       Last Taken: 4/24/15
             Time: 4 PM
 
Folic Acid (Folic Acid) 1 MG TABLET
    1 Milligram ORAL DAILY
    Comments:
       Last Taken: 4/25/15
             Time: 9 AM
 
FLUTICASONE/SALMETEROL (Advair 250-50 Diskus) 1 DSK DSK
    1 PUFF Inhale through mouth TWICE DAILY
    Qty = 60
 
Furosemide (Lasix) 20 MG TAB
    1 Tablet ORAL DAILY
    Comments:
       Last Taken: 4/25/15
             Time: 9 AM
 
Albuterol Sulfate (Ventolin Hfa) 18 GM HFA.AER.AD
    2 Puff Inhale through mouth EVERY 4-6 HOURS AS NEEDED as needed for ASTHMA
    Comments:
       Last Taken:3/13/17
             Time: 9:25A.M
 
Meloxicam (Meloxicam) 15 MG TABLET
    1 Tablet ORAL DAILY
    Qty = 30
    Comments:
       Last Taken:NOT GIVEN THIS ADMISSION
             Time:
 
Calcipotriene (Calcipotriene) 60 GM CREAM..G.
    1 Application On the skin As Directed
    Qty = 120
    Comments:
       Last Taken:NOT GIVEN THIS ADMISSION
             Time:
 
Triamcinolone Acetonide (Triamcinolone Acetonide) 15 GM OINT...G.
    1 Application On the skin As Directed
    Qty = 454
    Instructions:
       apply to affected area(s)
    Comments:
       Last Taken:NOT GIVEN THIS ADMISSION
             Time:
 
Hydroxyzine HCl (Hydroxyzine HCl) 25 MG TABLET
    1 Tablet ORAL Q8H
    Qty = 12
    Comments:
       Last Taken:NOT GIVEN THIS ADMISSION
             Time:
 
Metoprolol Succ XL (Toprol XL) 25 MG TAB
    25 Milligram ORAL DAILY
    Qty = 30
    Comments:
       Last Taken:2/14/17
             Time: 8:22A.M
 
Magnesium Oxide (Magnesium Oxide) 400 MG TABLET
    400 Milligram ORAL DAILY
    Qty = 4
    Comments:
       Last Taken:NOT GIVEN THIS ADMISSION
             Time:
 
Start taking the following new medications:
Apixaban (Eliquis) 5 MG TABLET
    5 Milligram ORAL Q12H
    Days = 30
    No Refills
    Comments:
       Last Taken:2/14/17
             Time: 6:45A.M
 
Prednisone (Prednisone) 10 MG TABLET
    1 Tablet ORAL TWICE DAILY
    Qty = 12
    No Refills
    Instructions:
       On           Take
       2/14-2/16    3 tablets
       2/17-2/18    2 tablets
       2/19-2/20    1 tablets
       then stop
 
 
Copies To:
ALBERTA ALONZO
 
Attending MD Review Statement
Documenting Attending:
LUIS LORENZO,SPENSER

## 2017-04-08 ENCOUNTER — HOSPITAL ENCOUNTER (EMERGENCY)
Dept: HOSPITAL 68 - ERH | Age: 82
End: 2017-04-08
Payer: COMMERCIAL

## 2017-04-08 VITALS — DIASTOLIC BLOOD PRESSURE: 82 MMHG | SYSTOLIC BLOOD PRESSURE: 125 MMHG

## 2017-04-08 VITALS — BODY MASS INDEX: 42.66 KG/M2 | WEIGHT: 315 LBS | HEIGHT: 72 IN
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
## 2017-04-08 DIAGNOSIS — M25.561: Primary | ICD-10-CM
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
## 2017-04-08 NOTE — ED UPPER/LOWER EXTREMITY COMPL
History of Present Illness
 
General
Chief Complaint: Lower Extremity Problems
Stated Complaint: KNEE PAIN
Source: patient
Exam Limitations: no limitations
 
Vital Signs & Intake/Output
Vital Signs & Intake/Output
 Vital Signs
 
 
Date Time Temp Pulse Resp B/P Pulse O2 O2 Flow FiO2
 
     Ox Delivery Rate 
 
 1925 96.0 96 16 125/82 96 Room Air  
 
 1801      Room Air  
 
 1758 96.1 62 18 121/82 96 Room Air  
 
 1527 97.5 74 20 100/65 95 Room Air  
 
 
 ED Intake and Output
 
 
  0000  1200
 
Intake Total  
 
Output Total 200 
 
Balance -200 
 
   
 
Output, Urine 200 
 
Patient 325 lb 
 
Weight  
 
 
Allergies
Coded Allergies:
No Known Allergies (17)
 
Reconcile Medications
Adalimumab (Humira Pen) (Unknown Strength) PEN.IJ.KIT   (Unknown Dose) SC AD 
PSORIASIS  (Reported)
Albuterol Sulfate (Ventolin Hfa) 18 GM HFA.AER.AD   2 PUF INH Q4-6 PRN PRN 
ASTHMA  (Reported)
Apixaban (Eliquis) 5 MG TABLET   5 MG PO Q12H AF
Aspirin (Ecotrin*) 81 MG TABLET.DR   1 TAB PO DAILY HEART/BLOOD  (Reported)
Calcipotriene 60 GM CREAM..G.   1 TATO TOP AD SKIN  (Reported)
Cyanocobalamin (Vitamin B-12) (Cyanocobalamin Injection) 1,000 MCG/ML VIAL   1 
ML IM Q30D SUPPLEMENT  (Reported)
Fluticasone/Salmeterol (Advair 250-50 Diskus) 250 MCG-50 MCG/DOSE BLST.W.DEV   1
PUF INH BID RESP.  (Reported)
Folic Acid 1 MG TABLET   1 TAB PO DAILY SUPPLEMENT  (Reported)
Furosemide 40 MG TABLET   1 TAB PO DAILY DIURETIC  (Reported)
Magnesium Oxide 400 MG TABLET   400 MG PO DAILY supplement
Meloxicam 15 MG TABLET   1 TAB PO DAILY PAIN  (Reported)
Metoprolol Succinate (Unknown Strength) TAB   (Unknown Dose) PO DAILY HEART/BP  
(Reported)
Simvastatin (Simvastatin*) 40 MG TABLET   1 TAB PO QPM CHOLESTEROL  (Reported)
Triamcinolone Acetonide 15 GM OINT...G.   1 TATO TOP AD SKIN  (Reported)
     apply to affected area(s)
 
Triage Note:
TRIAGE: PT TO ER WITH NIECE C/C RT KNEE PAIN S/P
FALL YESTERDAY.  STATES HE WAS TYING HIS SHOES AND
FELL ON KNEE.  HAS NOT TAKEN ANY OTC PAIN
RELIEVERS, STATES "I DON'T LIKE TYLENOL".  NIECE
STATES HE DOES TAKE TYLENOL PRN PAIN BUT HAS NOT
HAD ANY SINCE FALLING.
Triage Nurses Notes Reviewed? yes
Onset: Abrupt
Duration: constant
Timing: single episode today
Severity: severe
Severity Numbers: 7
Pain/Injury Location:
Right: Knee. 
Method of Injury: direct blow, incised
HPI:
Patient is a 84-year-old male with past medical history of CHF and chronic lower
extremity edema, psoriasis and osteo-arthritis who is brought in by ambulance 
for concerns of mechanical fall in which patient lives in a private residence by
himself where he was trying to tie his shoes in a bent over position patient 
subsequently in a standing position and fell forward landing on the right 
anterior aspect of his knee resulting in 7/10 localize right knee pain.  No 
medications were administered prior to arrival.  Patient usually walks with a 
cane for fall prevention assistance.  Denies any preceding episode of 
lightheaded sensation or dizziness denies any head strike neck pain back pain 
ankle or hip pain.
(DAELA HUMPHRIES)
 
Past History
 
Medical History
Any Pertinent Medical History? see below for history
Neurological: NONE
EENT: PSORIASIS
Cardiovascular: CAD, mitral stenosis
Respiratory: asthma
Gastrointestinal: NONE
Hepatic: NONE
Renal: NONE
Musculoskeletal: gout, OSTEOARTHRITIS
Psychiatric: NONE
Endocrine: NONE
Blood Disorders: NONE
Cancer(s): NONE
GYN/Reproductive: NONE
Other Medical Hx:
Psoriasis
History of MRSA: No
History of VRE: No
History of CDIFF: No
Influenza Vaccine: 10/01/16
 
Surgical History
Surgical History: CABG
 
Psychosocial History
Who do you live with Patient/Self
Services at Home Home Health Aide, Nursing
What is your primary language English
 
Family History
Family History, If Any:
SISTER
  FH: diabetes mellitus
 
Hx Contributory? No
(ADELA HUMPHRIES)
 
Review of Systems
 
Review of Systems
Constitutional:
Reports: no symptoms. 
EENTM:
Reports: no symptoms. 
Respiratory:
Reports: no symptoms. 
Cardiovascular:
Reports: no symptoms. 
Gastrointestinal/Abdominal:
Reports: no symptoms. 
Genitourinary:
Reports: no symptoms. 
Musculoskeletal:
Reports: see HPI, joint pain. 
Skin:
Reports: no symptoms. 
Neurological/Psychological:
Reports: no symptoms. 
Hematologic/Endocrine:
Reports: no symptoms. 
Immunological:
Reports: no symptoms. 
All Other Systems: Reviewed and Negative
(ADELA HUMPHRIES)
 
Physical Exam
 
Physical Exam
General Appearance: no apparent distress, alert, comfortable
Neurologic/Tendon: normal sensation, normal motor functions, normal tendon 
functions, responds to pain, no evidence tendon injury, no pulse deficit
Skin: intact, normal color, warm/dry
Comments:
Well-developed well-nourished person in no acute distress
HEENT: Normal EENT exam, 
Neck: Supple, no lymphadenopathy, normal range of motion without pain or 
tenderness
Back: Nontender, no CVA tenderness. 
Cardiovascular: Regular rate and rhythms no murmurs rubs or gallops, normal JVP
Respiratory: Chest nontender. No respiratory distress.breath sounds clear to 
auscultation bilaterally
Abdomen: Soft, nontender nondistended, no appreciable organomegaly. Normal bowel
sounds. No ascites
Extremity: No edema, no calf tenderness to palpation, normal and equal pulses.
Right hip nontender full active range of motion straight leg raise performed
Right knee normal inspection full active range of motion anterior joint line 
point tenderness negative valgus stress test negative varus stress test negative
anterior drawer test negative posterior drawer test
Right ankle nontender full active range of motion
Right lower extremity dermatomes intact pedal pulse +2
Neuro: Alert oriented x3, motor sensory normal,
Skin: No appreciable rash on exposed skin, skin is warm and dry.
Psych: Mood and affect is normal, memory and judgment is normal.
(JAYLIN MONTELONGO,ADELA)
 
Progress
Differential Diagnosis: arterial insufficiency, compartment syndrome, contusion,
dislocation, DVT, fracture, gout, septic arthritis, sprain, tendon injury
Plan of Care:
Patient had no osseous injuries noted on x-ray
Patient declined pain medications when offered.  Patient noted to be supervised 
with a walker showing normal steady gait in minimal assistance was needed using 
the walker
Discussed patient with case management who will expand home health services
I gave prescription of assisted walker to family members.
I stressed importance of fall prevention with family member and patient
 
Discussed disposition and plan with Dr. GARCIA who agrees
Diagnostic Imaging:
Viewed by Me: Radiology Read. 
Radiology Impression: no fracture
Comments:
PATIENT: CHRISTELLE CRUZ  MEDICAL RECORD NO: [id number]
PRESENT AGE: 84  PATIENT ACCOUNT NO: [id number]
: 33  LOCATION: [facility]
ORDERING PHYSICIAN: ADELA MONTELONGO  
 
  SERVICE DATE: 
EXAM TYPE: RAD - XRY-KNEE COMPLETE RIGHT
 
EXAMINATION:
XR KNEE, RIGHT
 
CLINICAL INFORMATION:
Right knee pain. Status post fall yesterday.
 
COMPARISON:
Right knee done on 2014.
 
TECHNIQUE:
Four views , 5 images of the right knee.
 
FINDINGS:
Marked decrease joint space, subchondral sclerosis, osteophyte formations,
consistent with moderate to severe osteoarthrosis is present at the medial
compartment. Mild to moderate osteoarthrosis is noted at the lateral and
patellofemoral compartments. There is no joint effusion present. No
significant change.
 
IMPRESSION:
Tricompartmental degenerative osteoarthrosis, appears stable since 2014.
 
DICTATED BY: MANUEL CELESTE MD 
DATE/TIME DICTATED:17
(ADELA HUMPHRIES)
 
Departure
 
Departure
Disposition: HOME OR SELF CARE
Condition: Stable
Clinical Impression
Primary Impression: Right knee pain
Secondary Impressions: Fall
Referrals:
FABRICIO LORENZO,ALBERTA BANDA (PCP/Family)
 
Additional Instructions:
As discussed please  the assisted walker prescription at a medical device
STRORE AND begin to use at all times for fall prevention.  YOU will also be 
evaluated and receive a phone call for expansion of home health services.  Begin
over-the-counter ibuprofen and/or Tylenol for pain.  If no better in one week 
follow-up with orthopedic Dr. Campos.  Continue all medications as directed 
Departure Forms:
Customer Survey
General Discharge Information
(ADELA HUMPHRIES)
 
PA/NP Co-Sign Statement
Statement:
ED Attending supervision documentation-
 
[X] I saw and evaluated the patient. I have also reviewed all the pertinent lab 
results and diagnostic results. I agree with the findings and the plan of care 
as documented in the PA's/NP's documentation. 
 
[X] I have reviewed the ED Record and agree with the PA's/NP's documentation.
 
[] Additions or exceptions (if any) to the PAs/NP's note and plan are 
summarized below:
[]
 
(JOSE LORENZO,LYNNETTE)

## 2017-04-08 NOTE — RADIOLOGY REPORT
EXAMINATION:
XR KNEE, RIGHT
 
CLINICAL INFORMATION:
Right knee pain. Status post fall yesterday.
 
COMPARISON:
Right knee done on 03/20/2014.
 
TECHNIQUE:
Four views , 5 images of the right knee.
 
FINDINGS:
Marked decrease joint space, subchondral sclerosis, osteophyte formations,
consistent with moderate to severe osteoarthrosis is present at the medial
compartment. Mild to moderate osteoarthrosis is noted at the lateral and
patellofemoral compartments. There is no joint effusion present. No
significant change.
 
IMPRESSION:
Tricompartmental degenerative osteoarthrosis, appears stable since 03/20/2014.

## 2018-02-12 NOTE — ADMISSION CERTIFICATION
Admission Certification
 
Certification Statement
- As attending physician, I certify that at the time of
- admission, based on clinical presentation, severity of
- symptoms, need for further diagnostic testing and
- therapeutic interventions, and risk of adverse outcomes
- without in-hospital treatment, in my clinical assessment,
- this patient requires an acute hospital stay for a minimum
- of two nights or longer. I have also considered psychsocial
- factors such as support system, advanced age, financial
- issues, cognitive issues, and failed out-patient treatments,
- past re-admission history, safety of patient, and lack of
- compliance as applicable.
Specific rationale supporting this admission is:
The patient presents with chest pain and dyspnea with cough and green sputum. 
Possible bsilar infiltrate on CXR. ?Atrial flutter on EKG. Needs admit to 
telemetry floor. Cardiology consult Dr. Tolentino. IV heparin, serial troponin 
levels. Will use po prednisone and antibiotics for possible community acquired 
pneumonia (Ceftriaxone/Zithromax). [facility]  Infectious Disease  Consult Note    Patient Name: Kelly Mariano  MRN: [id number]  Admission Date: 2/9/2018  Hospital Length of Stay: 2 days  Attending Physician: Odilon Subramanian MD  Primary Care Provider: Primary Doctor No     Isolation Status: No active isolations    Patient information was obtained from patient, past medical records and ER records.      Consults  Assessment/Plan:     * Bilateral cellulitis of lower leg    Will continue vanco/meropenem for now.    Wound care, will use cultures to guide therapy .        Bacteremia due to Staphylococcus aureus    Cardiac echo did not show endocarditis .  Will follow final drug susceptibility of org  Continue vanco.  Source is likely the lower extremity skin lesions         Acute combined systolic and diastolic heart failure    Diuresis as per primary team        Pleural effusion on right    S/p thoracentesis .  Pulmonology follow up .            Thank you for your consult. I will follow-up with patient. Please contact us if you have any additional questions.    Xavier Castillo MD  Infectious Disease  [facility]    Subjective:     Principal Problem: Bilateral cellulitis of lower leg    HPI: 66 year old wo,an who was admitted with bilateral lower extremity swelling   and sores.. Per EMS, pt's living conditions are extremely poor.   Since admission ,blood cultures on 02/09-staph aureus .  CT angiogram of the lower extremity -02/10-   1.  There is no hemodynamically significant stenosis of either lower extremity arterial vasculature.  There is three-vessel runoff at the ankles bilaterally.      Chest ultrasound done on 02/11- 1. Large right pleural effusion  MRI of the foot -both left and right did not show any evidence of osteomyelitis.    History reviewed. No pertinent past medical history.    Past Surgical History:   Procedure Laterality Date    SPINAL FUSION         Review of patient's allergies indicates:   Allergen  Reactions    Pcn [penicillins] Itching       Medications:  No prescriptions prior to admission.     Antibiotics     Start     Stop Route Frequency Ordered    02/10/18 1330  vancomycin 1 g in 0.9% sodium chloride 250 mL IVPB (ready to mix system)      -- IV Every 12 hours (non-standard times) 02/10/18 0926    02/09/18 2030  meropenem-0.9% sodium chloride 500 mg/50 mL IVPB      -- IV Every 6 hours (non-standard times) 02/09/18 1814        Antifungals     None        Antivirals     None             There is no immunization history on file for this patient.    Family History     None        Social History     Social History    Marital status: Single     Spouse name: N/A    Number of children: N/A    Years of education: N/A     Social History Main Topics    Smoking status: Former Smoker     Types: Cigarettes    Smokeless tobacco: None    Alcohol use Yes      Comment: occasional    Drug use: No    Sexual activity: Not Asked     Other Topics Concern    None     Social History Narrative    None     Review of Systems   Constitutional: Positive for activity change, appetite change and fatigue. Negative for chills, diaphoresis and fever.   HENT: Negative.    Eyes: Negative for pain and redness.   Respiratory: Positive for shortness of breath. Negative for cough and wheezing.    Cardiovascular: Positive for leg swelling. Negative for chest pain and palpitations.   Gastrointestinal: Negative for abdominal pain, constipation, diarrhea, nausea and vomiting.   Genitourinary: Negative for decreased urine volume, difficulty urinating, dysuria and hematuria.   Musculoskeletal: Negative for arthralgias and myalgias.        Falls   Skin: Positive for rash and wound.   Neurological: Positive for weakness. Negative for dizziness and light-headedness.   Psychiatric/Behavioral: Negative for confusion. The patient is not nervous/anxious.      Objective:     Vital Signs (Most Recent):  Temp: 97.4 °F (36.3 °C) (02/11/18  1930)  Pulse: 89 (02/11/18 1930)  Resp: 18 (02/11/18 1930)  BP: (!) 115/58 (02/11/18 1930)  SpO2: 99 % (02/11/18 1930) Vital Signs (24h Range):  Temp:  [96.3 °F (35.7 °C)-97.9 °F (36.6 °C)] 97.4 °F (36.3 °C)  Pulse:  [] 89  Resp:  [18] 18  SpO2:  [92 %-99 %] 99 %  BP: ()/(53-64) 115/58     Weight: 76.2 kg (167 lb 15.9 oz)  Body mass index is 28.84 kg/m².    Estimated Creatinine Clearance: 79 mL/min (based on SCr of 0.7 mg/dL).    Physical Exam   Constitutional: She is oriented to person, place, and time. She appears well-developed.  Non-toxic appearance.   Unkempt  female - appears stated age   HENT:   Head: Normocephalic and atraumatic.       Mouth/Throat: Mucous membranes are dry.   Dry tongue and oral MM   Eyes: Conjunctivae are normal.   Neck: Normal range of motion. Neck supple.   Cardiovascular: Regular rhythm.  Tachycardia present.    Pulmonary/Chest: Effort normal. She has rales.   Abdominal: Soft. Bowel sounds are normal. She exhibits distension. There is no tenderness. There is no rebound and no guarding.   Musculoskeletal: She exhibits edema (+2/+3 pitting edema to BLE).   Generalized weakness   Neurological: She is alert and oriented to person, place, and time.   Skin:   Abrasion to forehead and left chin area  Candidal appearing rash underneath breasts and abdominal fold  Ulcerations and cellulitis to feet .                Psychiatric: She has a normal mood and affect. Her behavior is normal.   Nursing note and vitals reviewed.      Significant Labs:   Blood Culture:   Recent Labs  Lab 02/09/18  0955 02/09/18  1000   LABBLOO No Growth to date  No Growth to date  No Growth to date Gram stain aer bottle: Gram positive cocci in clusters resembling Staph   Results called to and read back by:Alison Ghosh RN 02/10/2018  06:43  Gram stain hillary bottle: Gram positive cocci in clusters resembling Staph  STAPHYLOCOCCUS AUREUSSusceptibility pendingID [place] required [place] [facility].     BMP:   Recent Labs  Lab 02/11/18  0436 02/11/18  1512   GLU 95 102     --    K 3.1*  --    CL 98  --    CO2 35*  --    BUN 18  --    CREATININE 0.7  --    CALCIUM 8.2*  --    MG 1.5*  --      CBC:   Recent Labs  Lab 02/11/18 0436   WBC 6.79   HGB 12.8   HCT 42.4   *     All pertinent labs within the past 24 hours have been reviewed.    Significant Imaging: I have reviewed all pertinent imaging results/findings within the past 24 hours.

## 2018-04-15 ENCOUNTER — HOSPITAL ENCOUNTER (INPATIENT)
Dept: HOSPITAL 68 - ERH | Age: 83
LOS: 5 days | Discharge: NURSING, REHAB OR LONG-TERM CARE | DRG: 312 | End: 2018-04-20
Admitting: INTERNAL MEDICINE
Payer: COMMERCIAL

## 2018-04-15 VITALS — BODY MASS INDEX: 34.03 KG/M2 | WEIGHT: 251.25 LBS | HEIGHT: 72 IN

## 2018-04-15 DIAGNOSIS — J44.9: ICD-10-CM

## 2018-04-15 DIAGNOSIS — I25.10: ICD-10-CM

## 2018-04-15 DIAGNOSIS — I05.0: ICD-10-CM
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
## 2018-04-15 DIAGNOSIS — N32.81: ICD-10-CM

## 2018-04-15 DIAGNOSIS — M10.9: ICD-10-CM

## 2018-04-15 DIAGNOSIS — I11.0: ICD-10-CM

## 2018-04-15 DIAGNOSIS — E86.0: ICD-10-CM

## 2018-04-15 DIAGNOSIS — Z66: ICD-10-CM

## 2018-04-15 DIAGNOSIS — I50.22: ICD-10-CM

## 2018-04-15 DIAGNOSIS — Z91.81: ICD-10-CM

## 2018-04-15 DIAGNOSIS — R26.2: ICD-10-CM

## 2018-04-15 DIAGNOSIS — E78.5: ICD-10-CM

## 2018-04-15 DIAGNOSIS — F79: ICD-10-CM

## 2018-04-15 DIAGNOSIS — I48.91: ICD-10-CM

## 2018-04-15 DIAGNOSIS — E87.2: ICD-10-CM

## 2018-04-15 DIAGNOSIS — R55: Primary | ICD-10-CM
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
## 2018-04-15 DIAGNOSIS — M25.461: ICD-10-CM

## 2018-04-15 DIAGNOSIS — Z79.01: ICD-10-CM

## 2018-04-15 DIAGNOSIS — M19.90: ICD-10-CM

## 2018-04-15 DIAGNOSIS — F32.9: ICD-10-CM

## 2018-04-15 DIAGNOSIS — Z79.51: ICD-10-CM

## 2018-04-15 DIAGNOSIS — Z79.82: ICD-10-CM

## 2018-04-15 DIAGNOSIS — Z95.1: ICD-10-CM

## 2018-04-15 DIAGNOSIS — Z95.2: ICD-10-CM

## 2018-04-15 DIAGNOSIS — M25.462: ICD-10-CM

## 2018-04-15 DIAGNOSIS — M17.0: ICD-10-CM

## 2018-04-15 DIAGNOSIS — L40.50: ICD-10-CM

## 2018-04-15 DIAGNOSIS — R53.1: ICD-10-CM

## 2018-04-15 DIAGNOSIS — I44.7: ICD-10-CM

## 2018-04-15 LAB
ABSOLUTE GRANULOCYTE CT: 5.7 /CUMM (ref 1.4–6.5)
APTT BLD: 39 SEC (ref 25–37)
BASOPHILS # BLD: 0 /CUMM (ref 0–0.2)
BASOPHILS NFR BLD: 0.4 % (ref 0–2)
EOSINOPHIL # BLD: 0 /CUMM (ref 0–0.7)
EOSINOPHIL NFR BLD: 0.3 % (ref 0–5)
ERYTHROCYTE [DISTWIDTH] IN BLOOD BY AUTOMATED COUNT: 15.6 % (ref 11.5–14.5)
GRANULOCYTES NFR BLD: 74.4 % (ref 42.2–75.2)
HCT VFR BLD CALC: 41.5 % (ref 42–52)
LYMPHOCYTES # BLD: 0.9 /CUMM (ref 1.2–3.4)
MCH RBC QN AUTO: 29 PG (ref 27–31)
MCHC RBC AUTO-ENTMCNC: 32.6 G/DL (ref 33–37)
MCV RBC AUTO: 89 FL (ref 80–94)
MONOCYTES # BLD: 1 /CUMM (ref 0.1–0.6)
PLATELET # BLD: 166 /CUMM (ref 130–400)
PMV BLD AUTO: 8.5 FL (ref 7.4–10.4)
PROTHROMBIN TIME: 18.9 SEC (ref 9.4–12.5)
RED BLOOD CELL CT: 4.67 /CUMM (ref 4.7–6.1)
WBC # BLD AUTO: 7.6 /CUMM (ref 4.8–10.8)

## 2018-04-15 PROCEDURE — 87075 CULTR BACTERIA EXCEPT BLOOD: CPT

## 2018-04-15 NOTE — ED MVC/FALL/TRAUMA COMPLAINT
History of Present Illness
 
General
Chief Complaint: Fall
Stated Complaint: BIBA FOR FALL
Source: patient, family, EMS
Exam Limitations: poor historian
 
Vital Signs & Intake/Output
Vital Signs & Intake/Output
 Vital Signs
 
 
Date Time Temp Pulse Resp B/P B/P Pulse O2 O2 Flow FiO2
 
     Mean Ox Delivery Rate 
 
04/15 1408 98.2 86 16 102/66  96 Room Air  
 
04/15 1242      98 Room Air  
 
04/15 1202 97.7 93 16 121/87  97 Room Air  
 
 
 
Allergies
Coded Allergies:
No Known Allergies (17)
 
Reconcile Medications
Adalimumab (Humira Pen) (Unknown Strength) PEN.IJ.KIT   (Unknown Dose) SC AD 
PSORIASIS  (Reported)
Albuterol Sulfate (Ventolin Hfa) 18 GM HFA.AER.AD   2 PUF INH Q4-6 PRN PRN 
ASTHMA  (Reported)
Apixaban (Eliquis) 5 MG TABLET   5 MG PO Q12H AF
Aspirin (Ecotrin*) 81 MG TABLET.DR   1 TAB PO DAILY HEART/BLOOD  (Reported)
Clotrimazole 1 % CREAM..G.   1 TATO TOP QAMPM rash
     apply to affected area(s)
Cyanocobalamin (Vitamin B-12) (Cyanocobalamin Injection) 1,000 MCG/ML VIAL   1 
ML IM Q30D SUPPLEMENT  (Reported)
Erythromycin Base (Erythromycin) 5 MG/GRAM (0.5 %) OINT...G.   1 TATO OPH 4XDP 
blepharitis
     apply 1 cm ribbon into the lower conjunctival sac
Fluticasone/Salmeterol (Advair 250-50 Diskus) 250 MCG-50 MCG/DOSE BLST.W.DEV   1
PUF INH BID RESP.  (Reported)
Folic Acid 1 MG TABLET   1 TAB PO DAILY SUPPLEMENT  (Reported)
Furosemide 40 MG TABLET   1 TAB PO DAILY DIURETIC  (Reported)
Magnesium Oxide 400 MG TABLET   400 MG PO DAILY supplement
Meloxicam 15 MG TABLET   1 TAB PO DAILY PAIN  (Reported)
Metoprolol Succinate 50 MG TAB.ER.24H   1 TAB PO DAILY HEART  (Reported)
Nitrofurantoin Monohyd/M-Cryst (Macrobid 100 MG Capsule) 100 MG CAPSULE   1 CAP 
PO BID uti
     with food
Sertraline HCl 50 MG TABLET   1 TAB PO DAILY DEPRESSION  (Reported)
Simvastatin (Simvastatin*) 40 MG TABLET   1 TAB PO QPM CHOLESTEROL  (Reported)
 
Triage Note:
PT TO ER BIBA:
C/C RIGHT KNEE PAIN S/P UNWITNESSED FALL
TODAY. HX OF AFIB TAKES COUMADIN PER DAUGHTER.
PT'S DAUGHTER STATES PT FELL IN BATHROOM ONTO
KNEE, WHEN CAREGIVER FOUND HIM HE WAS SITTING ON
THE COUCH, UNABLE TO STATE HOW HE GOT THERE. WALKS
WITH WALKER AT BASELINE. PT DENIES HEADSTRIKE.
[facility] (PERSON AND LOCATION). EVIDENCE OF BRUISING
TO RIGHT HUMERUS AND LEFT SHOULDER ?OLD, PER
DAUGHTER HAS HAD 2 RECENT FALLS AT [facility].
PAIN WITH PALATION TO RLE, LLE NOTED TO BE COLD
FROM CALF TO TOES WITH DELAYED CAP REFILL. HIEU MONTELONGO
AWARE.
Triage Nurses Notes Reviewed? yes
Onset: Abrupt
Duration: day(s): (1), constant, continues in ED
Timing: recent history
Severity: moderate, severe
Injuries/Fall Location: lower extremity
Method of Injury: fall
Loss of Consciousness: unsure
No Modifying Factors: none
HPI:
85-year-old male past medical history of atrial fibrillation, mitral stenosis, 
coronary artery disease, osteoarthritis presents for evaluation after a fall.  
Patient's daughter reports that patient was found by his caregiver sitting on 
the couch this morning and patient stated that he fell last night.  Patient 
reports that he thinks that he became dizzy lightheaded and fell onto his knees.
 He denies any head strike he denies loss of consciousness.  No changes in 
vision or vomiting.  He does take Coumadin.  Patient states he then crawled to 
the couch.  He is unsure exactly how long he was on the ground or when exactly 
the fall happened.  According to the daughter he is usually able to walk around 
with a walker and lives by himself.  Daughter reports that his mental status is 
at baseline currently.  He is alert and oriented 2.  Patient denies any chest 
pain or shortness of breath before the fall.  He has fallen recently according 
to the daughter causing injuries to the left shoulder and left elbow.
(Hieu Solorio)
 
Past History
 
Travel History
Traveled to [place] past 21 day No
 
Medical History
Any Pertinent Medical History? see below for history
Neurological: NONE
EENT: PSORIASIS
Cardiovascular: AFIB, CAD, mitral stenosis
Respiratory: asthma
Gastrointestinal: NONE
Hepatic: NONE
Renal: NONE
Musculoskeletal: gout, OSTEOARTHRITIS
Psychiatric: NONE
Endocrine: NONE
Blood Disorders: NONE
Cancer(s): NONE
GYN/Reproductive: NONE
Other Medical Hx:
Psoriasis
History of MRSA: No
History of VRE: No
History of CDIFF: No
 
Surgical History
Surgical History: CABG
 
Psychosocial History
Who do you live with Patient/Self
Services at Home Home Health Aide, Nursing
What is your primary language English
Tobacco Use: Never used
 
Family History
Family History, If Any:
SISTER
  FH: diabetes mellitus
 
Hx Contributory? No
(Hieu Solorio)
 
Review of Systems
 
Review of Systems
Constitutional:
Reports: no symptoms. 
Eyes:
Reports: no symptoms. 
Ears, Nose, Throat, Mouth:
Reports: no symptoms. 
Respiratory:
Reports: no symptoms. 
Cardiovascular:
Reports: no symptoms. 
Gastrointestinal/Abdominal:
Reports: no symptoms. 
Genitourinary:
Reports: no symptoms. 
Musculoskeletal:
Reports: see HPI, joint pain, muscle pain, muscle stiffness. 
Skin:
Reports: no symptoms. 
Neurological/Psychological:
Reports: no symptoms. 
All Other Systems: Reviewed and Negative
(Hieu Solorio)
 
Physical Exam
 
Physical Exam
General Appearance: well developed/nourished, no apparent distress, alert, awake
Head: atraumatic, normal appearance, no scalp hematomas lacerations or abrasions
Eyes:
Bilateral: normal appearance, PERRL, EOMI, other (BILATERAL PTOSIS). 
Ears, Nose, Throat, Mouth: hearing grossly normal, moist mucous membrane, 
Tympanic normal
Neck: normal inspection, supple, full range of motion, no midline tenderness
Respiratory: normal breath sounds, chest non-tender, no respiratory distress, 
lungs clear
Cardiovascular: regular rate/rhythm
Peripheral Pulses:
2+ radial (R), 2+ radial (L), 2+ femoral (R), 2+ femoral (L), 1+ tibialis 
posterior (R), 0 tibialis posterior (L), 1+ dorsalis pedis (R), 0 dorsalis pedis
(L)
Gastrointestinal: normal bowel sounds, soft, no organomegaly, tenderness (
BILATERAL LOWER ABDOMEN)
Back: normal inspection, normal range of motion, no vertebral tenderness
Extremities: range of motion of the bilateral lower extremities is reduced due 
to pain.  There is mild bruising and abrasions to bilateral knees.  Tenderness 
to palpation of the bilateral knees.  Patient is moving the bilateral ankles and
feet without difficulty.  He does have diffuse tenderness of the bilateral feet.
Neurologic/Psych: no motor/sensory deficits, awake, alert, alert and oriented to
person and place only
Skin: intact, normal color, warm/dry
Comments:
There is a palpable right sided dorsalis pedis pulse.  The left lower extremity 
dorsalis pedis pulse is not operable by DoppleR.  The left posterior tibialis 
pulse is palpable by Doppler.  Patient has intact sensation of the bilateral 
lower extremities.  The left lower extremity is cold from the knee down.  There 
is no change in skin tone. CAP Refill 3 or 4 seconds. 
 
Core Measures
ACS in differential dx? No
CVA/TIA Diagnosis No
Sepsis Present: No
Sepsis Focused Exam Completed? No
(Tomas MONTELONGO,Hieu)
 
Progress
Differential Diagnosis: aoritic dissection, abd injury, C/T/L spine injury, ext 
injury, ICH, pelvis injury, spinal cord injury, DVT, peripheral vascular disease
Diagnostic Imaging:
Viewed by Me: CT Scan.  Discussed w/RAD: CT Scan. 
Radiology Impression: PATIENT: CHRISTELLE CRUZ  MEDICAL RECORD NO: [id number] 
PRESENT AGE: 85  PATIENT ACCOUNT NO: [id number] : 33  LOCATION: [facility] 
ORDERING PHYSICIAN: Hieu MONTELONGO     SERVICE DATE: 04/15/ EXAM TYPE: CAT
- CT ABD & PELVIS W/O IV CONTRAS; CT CHEST WO IV CONTRAST EXAMINATION: CT CHEST,
ABDOMEN AND PELVIS WITHOUT CONTRAST CLINICAL INFORMATION: 85-year-old male, with
unwitnessed fall. Patient is on Coumadin. COMPARISON: None. TECHNIQUE: 
Multidetector volumetric imaging was performed from the thoracic inlet through 
the pubic symphysis without administration of any contrast. FINDINGS: The 
evaluation is technically limited due to lack of intravenous contrast. 
VISUALIZED NECK: Unremarkable. LUNGS: There is a linear 2 to 3 mm opacity noted 
at left upper lobe anteriorly (see the key images), may represent tiny 
atelectatic change versus nonspecific nodule. The remainder of the lungs fields 
otherwise appear clear. Mild hypoventilatory changes are noted at both lung 
bases. The tracheobronchial tree appeared patent. MEDIASTINUM: Significant 
atherosclerotic disease is noted within the aorta and its branches including 
mitral valve annular and aortic valve calcifications. Postoperative changes of 
sternotomy and CABG is noted. There is cardiomegaly present with predominant 
enlargement of the left atrium and left ventricle. PLEURA: There is no pleural 
effusion. No pleural mass or thickening. AXILLA: No lymphadenopathy LIVER, 
GALLBLADDER, BILIARY TREE: Unremarkable. PANCREAS: Unremarkable. SPLEEN: 
Unremarkable. ADRENAL GLANDS AND KIDNEYS: Both adrenal glands are unremarkable. 
There are multiple right renal cortical as well as extrahepatic hypodensities 
identified. Specifically, at the superior pole of the right kidney, there is a 
3.2 cm cortical hypodensity present with Hounsfield value of 33. A well-
circumscribed oval-shaped hypodense mass is noted at superior medial aspect of 
the right kidney likely represent an exophytic mass, measures 3.3 cm with 
Hounsfield value of 27. The largest bilobed mass is seen near the inferior pole,
measures 5.4 cm at its maximum anteroposterior dimension with Hounsfield value 
varying between 15-22. These are not optimally characterized however likely 
represent minimally complicated cysts. Within the left kidney, there is an 
exophytic pericapsular hypodensity present measures 1.8 cm at its maximum 
dimension with Hounsfield value of 32, may represent minimally complicated cyst.
Nonemergent follow-up ultrasound of both kidneys is recommended for further 
clarification. URETERS AND BLADDER: Both ureters appear decompressed. The 
urinary bladder is unremarkable. BOWEL LOOPS: Significant fecal residual is 
noted within the rectum and sigmoid colon. The appendix is visualized, appear 
unremarkable. The small bowel loops are decompressed. There is a small sliding 
hiatal hernia present. LYMPHOVASCULAR STRUCTURES: Diffuse atherosclerotic 
disease is noted within the aorta and its branches without aneurysm formation. 
There are no pathologically enlarged retroperitoneal, mesenteric, pelvic and/or 
inguinal, groin lymphadenopathy seen. PELVIS: There is no free fluid and/or free
air present. There is no pelvic mass seen. BONES: No suspicious lytic or 
sclerotic abnormalities seen. There is no CT evidence of any compression 
fracture identified within the thoracic and lumbosacral spine. Multilevel 
moderate degenerative spondylosis however is noted throughout the entire 
visualized thoracic and lumbosacral spine. Specifically, there is no CT evidence
of any intrathoracic, intra-abdominal and/or intrapelvic hematoma identified. 
IMPRESSION: 1. No acute intrathoracic, intra-abdominal and/or intrapelvic 
pathology is identified on this nonenhanced study. Specifically, no CT evidence 
of any hematoma identified within the chest abdomen and pelvis. 2. Incidental 
note is made of a tiny 2 to 3 mm linear opacity within the left upper lobe of 
the lung, may represent atelectatic changes versus tiny nodule. 3. Multiple 
right and solitary left renal focal abnormalities are identified, as described 
above, not optimally characterized. Follow-up nonemergent bilateral renal 
ultrasound is recommended for further clarification. According to the UPDATED 
2017 Fleischner Society recommendations, the advised follow-up imaging for solid
nodules < 6 mm is: LOW RISK PATIENT: No routine follow-up. HIGH RISK PATIENT: 
Optional CT at 12 months. DICTATED BY: Ridge Burch MD  DATE/TIME DICTATED:04/
15/18 / 1408 :RAD.RODAS  DATE/TIME TRANSCRIBED:04/15/18 / 1408 
CONFIDENTIAL, DO NOT COPY WITHOUT APPROPRIATE AUTHORIZATION.  <Electronically 
signed in Other Vendor System>                                                  
                                     SIGNED BY: Ridge Burch MD 04/15/18 1530
Initial ED EKG: AFIB (RATE 95), LBBB
(Tomas MONTELONGO,Hieu)
Plan of Care:
 Orders
 
 
Procedure Date/time Status
 
Heart Healthy Diet  B Active
 
Patient Data 04/15 1642 Active
 
Misc Message 04/15 1627 Active
 
ED Holding Orders 04/15 1627 Active
 
Admit to inpatient 04/15 1627 Active
 
Vital Signs 04/15 1627 Active
 
Code Status 04/15 1627 Active
 
RAPID VIRAL INFLUENZA A 04/15 1624 Active
 
LACTIC ACID 04/15 1521 Active
 
US-DUPLEX SCAN LOWER EXT ARTER 04/15 1433 Active
 
PARTIAL THROMBOPLASTIN TIME 04/15 1222 Complete
 
PROTHROMBIN TIME 04/15 1222 Complete
 
EKG 04/15 1222 Active
 
URINALYSIS 04/15 1221 Complete
 
TROPONIN LEVEL 04/15 1221 Complete
 
LACTIC ACID 04/15 1221 Complete
 
COMPREHENSIVE METABOLIC PANEL 04/15 1221 Complete
 
CREATINE PHOSPHOKINASE 04/15 1221 Complete
 
CBC WITHOUT DIFFERENTIAL 04/15 1221 Complete
 
 
 Laboratory Tests
 
 
 
04/15/18 1557:
Lactic Acid Pending
 
04/15/18 1325:
Urinalysis LIGHT  H, Urine Color YEL, Urine Clarity CLEAR, Urine pH 6.0, Ur 
Specific Gravity 1.025, Urine Protein TRACE  H, Urine Ketones TRACE  H, Urine 
Nitrite NEG, Urine Bilirubin NEG, Urine Urobilinogen 0.2, Ur Leukocyte Esterase 
NEG, Ur Microscopic SEDIMENT EXAMINED, Urine RBC 1-3, Urine WBC RARE, Ur 
Epithelial Cells MOD  H, Urine Bacteria RARE  H, Urine Mucus MOD  H, Urine 
Hemoglobin NEG, Urine Glucose NEG
 
04/15/18 1237:
Anion Gap 12, Estimated GFR > 60, BUN/Creatinine Ratio 31.1  H, Glucose 118  H, 
Lactic Acid 2.2  H, Calcium 9.1, Total Bilirubin 1.4  H, AST 29, ALT 22, 
Alkaline Phosphatase 82, Creatine Kinase 213  H, Troponin I 0.04, Total Protein 
7.4, Albumin 4.2, Globulin 3.2, Albumin/Globulin Ratio 1.3, PT 18.9  H, INR 1.72
 H, APTT 39  H, CBC w Diff NO MAN DIFF REQ, RBC 4.67  L, MCV 89.0, MCH 29.0, 
MCHC 32.6  L, RDW 15.6  H, MPV 8.5, Gran % 74.4, Lymphocytes % 11.7  L, 
Monocytes % 13.2  H, Eosinophils % 0.3, Basophils % 0.4, Absolute Granulocytes 
5.7, Absolute Lymphocytes 0.9  L, Absolute Monocytes 1.0  H, Absolute 
Eosinophils 0, Absolute Basophils 0
 Microbiology
04/15 1624  NASOPHARYN: Influenza Virus A & B Rapid Smear - ORD
 
Patient seen and evaluated.  He was brought in for evaluation after a possible 
fall.  It is unclear if this was a mechanical fall versus a possible syncopal 
episode.  Patient does report feeling dizzy and lightheaded before the fall.  We
'll treat this as a syncopal episode.  Additionally patient's left lower 
extremity is cold to touch from the knee down.  There are no palpable pulses.  
The left posterior tibial pulse was found by Doppler.  No skin color changes.  
Ultrasounds of the venous and arterial systems ordered.  A CT scan of the head 
neck chest and pelvis was ordered to evaluate for trauma considering patient is 
on Coumadin with an unwitnessed fall.  he'll monitored on telemetry.
 
Blood work does not show any acute changes.  No signs of infection.  Patient has
no chest pain.  EKG appears similar to previous.  Initial troponin is negative. 
No signs of trauma on CT scans.  Venous ultrasound negative for DVT.  
Preliminary report on arterial ultrasound does not show any significant 
peripheral vascular disease in the left lower extremity.  On reevaluation the 
left lower extremity is now warm to touch.  X-rays of the knees are negative for
fracture.  Patient is still feeling very weak and is unable to ambulate.  He is 
unable to get out of bed.  He usually is able to walk with a walker.  
Additionally he needs further evaluation and possible syncopal episode.  Patient
will be admitted to the hospital for further evaluation and treatment.  He'll 
require telemetry monitoring, serial labs, EKG, cardiology, physical therapy, 
case management, echocardiogram, case discussed with Dr. Caldwell he agrees patient 
admitted to the hospital.
 
 
(Tomas MONTELONGO,Hieu)
(Gato Caldwell MD)
 
Departure
 
Departure
Disposition: STILL A PATIENT
Condition: Stable
Clinical Impression
Primary Impression: Weakness
Secondary Impressions: Syncope and collapse
Referrals:
Dorothea Delcid (PCP/Family)
 
Departure Forms:
Customer Survey
General Discharge Information
 
Admission Note
Spoke With:
Elvis Carrasco MD
Documentation of Exam:
Documentation of any treatments & extenuating circumstances including Concerns 
Regarding Discharge (functional status, medication knowledge or non-compliance, 
living conditions, etc.) that warrant an admission rather than observation: 
Patient had a questionable syncopal episode during a fall last night.  He is 
currently unable to ambulate he usually walks and lives at home by himself.  [
Telemetry, cardiology, echocardiogram, serial labs, serial EKGs, physical 
therapy, case management]
 
(Hieu Solorio)
 
PA/NP Co-Sign Statement
Statement:
ED Attending supervision documentation-
 
[X] I saw and evaluated the patient. I have also reviewed all the pertinent lab 
results and diagnostic results. I agree with the findings and the plan of care 
as documented in the PA's/NP's documentation.  Patient presents for evaluation 
of injury sustained status post fall sometime overnight.  Physical examination 
reveals tenderness of the right lower extremity and coolness of the left leg 
compared to the right.  There is also a decreased dorsalis pedis pulses on the 
left compared to the right.  Aside from this however the patient's sensation 
remains intact and he wiggles his toes well.  Capillary refill is delayed at 3-4
seconds.
 
[] I have reviewed the ED Record and agree with the PA's/NP's documentation.
 
[] Additions or exceptions (if any) to the PAs/NP's note and plan are 
summarized below:
[]
 
(Paulette LORENZO,Gato RESTREPO)

## 2018-04-15 NOTE — CT SCAN REPORT
EXAMINATION:
CT HEAD WITHOUT CONTRAST
 
CT OF THE CERVICAL SPINE WITHOUT CONTRAST
 
CLINICAL INFORMATION:
85-year-old male with unwitnessed fall. Patient is on Coumadin.
 
COMPARISON:
CT of the head and cervical spine done on 02/03/2016.
 
TECHNIQUE:
Noncontrast CT scan of the head and cervical spine, using standard protocol.
Multiplanar reconstructed images are obtained. Multiplanar reconstructed
images are also obtained.
 
FINDINGS:
 
CT OF THE HEAD:
Age-appropriate moderate diffuse cortical atrophy is noted, unchanged since
02/03/2016. Mild chronic microvascular deep white matter ischemic changes are
also present, unchanged. Specifically, no evidence of intra-axial mass, mass
effect, extra-axial fluid collection, midline shift, acute intraparenchymal
hemorrhage and/or acute infarction present.
 
Both orbital globes, extraocular muscles, optic nerves appear bilaterally
symmetric and are unremarkable.
 
The bilateral mastoid air cells appear unremarkable.
 
CT OF THE CERVICAL SPINE:
Nonspecific straightening of the cervical spine is noted with underlying
significant facet degenerative arthritic changes throughout the entire
cervical spine (left greater than right). Moderate to severe multilevel
degenerative spondylosis is also noted at C4-C5, C5-C6, C6-C7 and mild
degenerative spondylosis within the remainder of the cervical spine. The
C1-C2 alignment is intact. There is no prespinal soft tissue hematoma
present. Both lung apices are clear.
 
Incidental note is made of extensive atherosclerotic disease within the
coronary arterial tree bilaterally, unchanged since prior study.
 
No significant change since prior study dated 02/03/2016.
 
IMPRESSION:
1. No acute intracranial pathology.
 
2. No CT evidence of any acute fracture no subluxation or dislocation or
prespinal soft tissue hematoma present at the cervical spine.
 
3. No significant change since most recent prior CT of the head and cervical
spine dated 02/03/2016. Specifically, no CT evidence of any acute
intracranial hemorrhage is seen.

## 2018-04-15 NOTE — HISTORY & PHYSICAL
Jeffrey Brennan MD 04/15/18 5648:
General Information and HPI
MD Statement:
I have seen and personally examined CHRISTELLE CRUZ and documented this H&P.
 
The patient is a 85 year old M who presented with a patient stated chief 
complaint of [unwitnessed fall].
 
Source of Information: patient, family
Exam Limitations: poor historian
History of Present Illness:
Patient is a 85-year-old intellectually challenged male with an extensive past 
medical history significant for A. fib, mitral stenosis, CAD status post CABG 
and aortic valve replacement, COPD, gout, psoriasis, HTN, HLD, OA, who presents 
after suffering an unwitnessed fall while at home.  Patient is a poor historian 
and much of the history was obtained from his niece who partakes in his care.  
Patient suffered a mechanical fall last week which was witnessed at an adult 
 center.  He did not suffer any injuries that time.  He was in his usual 
state of health until prior to admission he fell in his bathroom, he was able to
crawl to bed however he continued to feel weak.  He was found by his home health
aide called [id number].  Patient states that he slipped and fell and that he lost 
consciousness for niece is not a reliable historian.  He endorses 
lightheadedness preceding the fall.  He denied any associated chest pain, 
shortness breath, vomiting, diaphoresis.
 
Allergies/Medications
Allergies:
Coded Allergies:
No Known Allergies (11/16/17)
 
Home Med list
Adalimumab (Humira Pen) 40 MG/0.8 ML PEN.IJ.KIT   40 MG SC Q2W PSORIASIS  (
Reported)
Albuterol Sulfate (Ventolin Hfa) 18 GM HFA.AER.AD   2 PUF INH Q4-6 PRN PRN 
ASTHMA  (Reported)
Albuterol Sulfate 2.5 MG/3 ML (0.083 %) VIAL.NEB   1 Vial INH/SOL TID PRN RESP. 
(Reported)
Apixaban (Eliquis) 5 MG TABLET   5 MG PO Q12H AF
Aspirin (Ecotrin*) 81 MG TABLET.DR   1 TAB PO DAILY HEART/BLOOD  (Reported)
Clotrimazole 1 % CREAM..G.   1 TATO TOP QAMPM rash
     apply to affected area(s)
Cyanocobalamin (Vitamin B-12) (Cyanocobalamin Injection) 1,000 MCG/ML VIAL   1 
ML IM Q30D SUPPLEMENT  (Reported)
Docusate Sodium (Colace) 100 MG CAPSULE   1 CAP PO DAILY STOOL SOFTENER  (
Reported)
Ergocalciferol (Vitamin D2) (Vitamin D2) 50,000 UNIT CAPSULE   1 CAP PO Q30D 
SUPPLEMENT  (Reported)
Fluticasone/Salmeterol (Advair 250-50 Diskus) 250 MCG-50 MCG/DOSE BLST.W.DEV   1
PUF INH BID RESP.  (Reported)
Folic Acid 1 MG TABLET   1 TAB PO DAILY SUPPLEMENT  (Reported)
Furosemide 40 MG TABLET   1 TAB PO DAILY DIURETIC  (Reported)
Metoprolol Succinate 50 MG TAB.ER.24H   1 TAB PO DAILY HEART  (Reported)
Oxybutynin Chloride (Oxybutynin Chloride ER) (Unknown Strength) TAB.ER.24   (
Unknown Dose) PO DAILY UNKNOWN  (Reported)
Sennosides (Senna) 8.6 MG TABLET   2 TAB PO QHS GI  (Reported)
Sertraline HCl 50 MG TABLET   1 TAB PO DAILY DEPRESSION  (Reported)
Simvastatin (Simvastatin*) 40 MG TABLET   1 TAB PO QPM CHOLESTEROL  (Reported)
Tolterodine Tartrate (Detrol LA) 4 MG CAP.ER.24H   1 CAP PO DAILY BLADDER  (
Reported)
Triamcinolone Acetonide 0.1 % OINT...G.   1 TATO TOP AD SKIN  (Reported)
 
 
Past History
 
Travel History
Traveled to [place] past 21 day No
 
Medical History
Neurological: NONE
EENT: PSORIASIS
Cardiovascular: AFIB, CAD, mitral stenosis
Respiratory: asthma
Gastrointestinal: NONE
Hepatic: NONE
Renal: NONE
Musculoskeletal: gout, OSTEOARTHRITIS
Psychiatric: NONE
Endocrine: NONE
Blood Disorders: NONE
Cancer(s): NONE
GYN/Reproductive: NONE
Other Medical Hx:
Psoriasis
History of MRSA: No
History of VRE: No
History of CDIFF: No
 
Surgical History
Surgical History: CABG
 
Past Family/Social History
 
Family History
Relations & Conditions if any
SISTER
  FH: diabetes mellitus
 
 
Psychosocial History
Where do you live? Home
Who Do You Live With? self
Services at Home: Home Health Aide, Nursing
Primary Language: English
Smoking Status: Never Smoked
ETOH Use: former heavy use
Illicit Drug Use: denies illicit drug use
 
Functional Ability
Ambulation: walker
 
Review of Systems
 
Review of Systems
Constitutional:
Denies: chills, fever. 
EENTM:
Reports: no symptoms. 
Cardiovascular:
Denies: chest pain, palpitations, syncope. 
Respiratory:
Denies: cough, short of breath, sputum production. 
GI:
Reports: no symptoms. 
Genitourinary:
Reports: no symptoms. 
Musculoskeletal:
Reports: no symptoms. 
Skin:
Reports: no symptoms. 
Neurological/Psychological:
Reports: no symptoms. 
 
Exam & Diagnostic Data
Last 24 Hrs of Vital Signs/I&O
 Vital Signs
 
 
Date Time Temp Pulse Resp B/P B/P Pulse O2 O2 Flow FiO2
 
     Mean Ox Delivery Rate 
 
04/15 1804 98.2 92 18 121/68  97 Room Air  
 
04/15 1408 98.2 86 16 102/66  96 Room Air  
 
04/15 1242      98 Room Air  
 
04/15 1202 97.7 93 16 121/87  97 Room Air  
 
 
 Intake & Output
 
 
 04/15 1600 04/15 0800 04/15 0000
 
Intake Total   
 
Output Total 200  
 
Balance -200  
 
    
 
Output, Urine 200  
 
Patient 250 lb  
 
Weight   
 
Weight Estimated  
 
Measurement   
 
Method   
 
 
 
 
Physical Exam
General Appearance Alert, Cooperative, No Acute Distress, oriented to person, 
not time or place
Skin Temp/Moisture Exam: Warm/Dry
Sepsis Skin Exam (color): Normal for Ethnicity
Cardiovascular distant heart sounds, irregularly irregular rhythm, systolic 2/6 
murmur
Lungs Clear to Auscultation, Normal Air Movement
Abdomen Normal Bowel Sounds, Soft, No Tenderness
Neurological Normal Speech, Normal Tone, Sensation Intact, Cranial Nerves 3-12 
NL, patient was not compliant with lower extremity portion of neuro exam 
secondary to knee pain
Extremities No Clubbing, No Cyanosis, 1-2 + BLE edema
Last 24 Hrs of Labs/Dusty:
 Laboratory Tests
 
04/15/18 2034:
Troponin I Pending
 
04/15/18 1557:
Lactic Acid 2.0
 
04/15/18 1325:
Urinalysis LIGHT  H, Urine Color YEL, Urine Clarity CLEAR, Urine pH 6.0, Ur 
Specific Gravity 1.025, Urine Protein TRACE  H, Urine Ketones TRACE  H, Urine 
Nitrite NEG, Urine Bilirubin NEG, Urine Urobilinogen 0.2, Ur Leukocyte Esterase 
NEG, Ur Microscopic SEDIMENT EXAMINED, Urine RBC 1-3, Urine WBC RARE, Ur 
Epithelial Cells MOD  H, Urine Bacteria RARE  H, Urine Mucus MOD  H, Urine 
Hemoglobin NEG, Urine Glucose NEG
 
04/15/18 1237:
Anion Gap 12, Estimated GFR > 60, BUN/Creatinine Ratio 31.1  H, Glucose 118  H, 
Lactic Acid 2.2  H, Calcium 9.1, Phosphorus 3.4, Magnesium 2.3, Total Bilirubin 
1.4  H, AST 29, ALT 22, Alkaline Phosphatase 82, Creatine Kinase 213  H, 
Troponin I 0.04, Total Protein 7.4, Albumin 4.2, Globulin 3.2, Albumin/Globulin 
Ratio 1.3, TSH 3.030, Free T4 1.17, PT 18.9  H, INR 1.72  H, APTT 39  H, CBC w 
Diff NO MAN DIFF REQ, RBC 4.67  L, MCV 89.0, MCH 29.0, MCHC 32.6  L, RDW 15.6  H
, MPV 8.5, Gran % 74.4, Lymphocytes % 11.7  L, Monocytes % 13.2  H, Eosinophils 
% 0.3, Basophils % 0.4, Absolute Granulocytes 5.7, Absolute Lymphocytes 0.9  L, 
Absolute Monocytes 1.0  H, Absolute Eosinophils 0, Absolute Basophils 0
 Microbiology
04/15 1624  NASOPHARYN: Influenza Virus A & B Rapid Smear - ORD
 
 
 
Diagnostic Data
EKG Results
A fib, HR 95, QTc 488
 
CXR Results
CT OF THE HEAD:
Age-appropriate moderate diffuse cortical atrophy is noted, unchanged since
02/03/2016. Mild chronic microvascular deep white matter ischemic changes are
also present, unchanged. Specifically, no evidence of intra-axial mass, mass
effect, extra-axial fluid collection, midline shift, acute intraparenchymal
hemorrhage and/or acute infarction present.
 
Both orbital globes, extraocular muscles, optic nerves appear bilaterally
symmetric and are unremarkable.
 
The bilateral mastoid air cells appear unremarkable.
 
CT OF THE CERVICAL SPINE:
Nonspecific straightening of the cervical spine is noted with underlying
significant facet degenerative arthritic changes throughout the entire
cervical spine (left greater than right). Moderate to severe multilevel
degenerative spondylosis is also noted at C4-C5, C5-C6, C6-C7 and mild
degenerative spondylosis within the remainder of the cervical spine. The
C1-C2 alignment is intact. There is no prespinal soft tissue hematoma
present. Both lung apices are clear.
 
Incidental note is made of extensive atherosclerotic disease within the
coronary arterial tree bilaterally, unchanged since prior study.
 
No significant change since prior study dated 02/03/2016.
 
IMPRESSION:
1. No acute intracranial pathology.
 
2. No CT evidence of any acute fracture no subluxation or dislocation or
prespinal soft tissue hematoma present at the cervical spine.
 
3. No significant change since most recent prior CT of the head and cervical
spine dated 02/03/2016. Specifically, no CT evidence of any acute
intracranial hemorrhage is seen.
 
 
VISUALIZED NECK: Unremarkable.
 
LUNGS: There is a linear 2 to 3 mm opacity noted at left upper lobe
anteriorly (see the key images), may represent tiny atelectatic change versus
nonspecific nodule. The remainder of the lungs fields otherwise appear clear.
Mild hypoventilatory changes are noted at both lung bases.
 
The tracheobronchial tree appeared patent.
 
MEDIASTINUM: Significant atherosclerotic disease is noted within the aorta
and its branches including mitral valve annular and aortic valve
calcifications. Postoperative changes of sternotomy and CABG is noted. There
is cardiomegaly present with predominant enlargement of the left atrium and
left ventricle.
 
PLEURA: There is no pleural effusion. No pleural mass or thickening.
 
AXILLA: No lymphadenopathy
 
LIVER, GALLBLADDER, BILIARY TREE: Unremarkable.
 
PANCREAS: Unremarkable.
 
SPLEEN: Unremarkable.
 
ADRENAL GLANDS AND KIDNEYS: Both adrenal glands are unremarkable. There are
multiple right renal cortical as well as extrahepatic hypodensities
identified. Specifically, at the superior pole of the right kidney, there is
a 3.2 cm cortical hypodensity present with Hounsfield value of 33. A
well-circumscribed oval-shaped hypodense mass is noted at superior medial
aspect of the right kidney likely represent an exophytic mass, measures 3.3
cm with Hounsfield value of 27. The largest bilobed mass is seen near the
inferior pole, measures 5.4 cm at its maximum anteroposterior dimension with
Hounsfield value varying between 15-22. These are not optimally characterized
however likely represent minimally complicated cysts.
 
Within the left kidney, there is an exophytic pericapsular hypodensity
present measures 1.8 cm at its maximum dimension with Hounsfield value of 32,
may represent minimally complicated cyst.
 
Nonemergent follow-up ultrasound of both kidneys is recommended for further
clarification.
 
URETERS AND BLADDER: Both ureters appear decompressed. The urinary bladder is
unremarkable.
 
BOWEL LOOPS: Significant fecal residual is noted within the rectum and
sigmoid colon. The appendix is visualized, appear unremarkable. The small
bowel loops are decompressed. There is a small sliding hiatal hernia present.
 
LYMPHOVASCULAR STRUCTURES: Diffuse atherosclerotic disease is noted within
the aorta and its branches without aneurysm formation. There are no
pathologically enlarged retroperitoneal, mesenteric, pelvic and/or inguinal,
groin lymphadenopathy seen.
 
PELVIS: There is no free fluid and/or free air present. There is no pelvic
mass seen.
 
BONES: No suspicious lytic or sclerotic abnormalities seen. There is no CT
evidence of any compression fracture identified within the thoracic and
lumbosacral spine. Multilevel moderate degenerative spondylosis however is
noted throughout the entire visualized thoracic and lumbosacral spine.
 
Specifically, there is no CT evidence of any intrathoracic, intra-abdominal
and/or intrapelvic hematoma identified.
 
IMPRESSION:
1. No acute intrathoracic, intra-abdominal and/or intrapelvic pathology is
identified on this nonenhanced study. Specifically, no CT evidence of any
hematoma identified within the chest abdomen and pelvis.
 
2. Incidental note is made of a tiny 2 to 3 mm linear opacity within the left
upper lobe of the lung, may represent atelectatic changes versus tiny nodule.
 
3. Multiple right and solitary left renal focal abnormalities are identified,
as described above, not optimally characterized. Follow-up nonemergent
bilateral renal ultrasound is recommended for further clarification.
 
 
XRay knees
FINDINGS:
Right knee: Moderate to large knee effusion. Moderate chronic medial
compartment narrowing with tricompartmental osteophyte formation and chronic
superior subluxation of the patella. No acute fracture or dislocation is
seen. There is moderate soft tissue swelling. Vascular calcifications are
present.
 
Left knee: Moderate knee joint effusion. Severe chronic medial compartment
narrowing, with tricompartmental degenerative changes and superior patellar
subluxation, chronic. No acute fracture or acute dislocation is seen.
 
There is soft tissue swelling and vascular calcification seen.
 
IMPRESSION:
Bilateral knee arthritis most notable in the medial compartments, left worse
than right. No acute fracture or dislocation is seen. There are bilateral
joint effusions and soft tissue swelling.
 
Doppler LE US
Respiratory variation, normal compression and augmented flow are noted
throughout the lower extremities. The visualized common femoral vein,
superficial femoral vein, profunda femoral vein, popliteal vein and midcalf
peroneal and posterior tibial venous segments show no evidence of deep venous
thrombosis.
 
Please note that the study is limited by patient body habitus and mobility;
the right popliteal fossa was not evaluated.
 
There is no Baker's cyst.
 
IMPRESSION:
Normal triplex scan without evidence of deep venous thrombosis involving the
lower extremities.
 
The study is limited by patient body habitus and limited mobility. The right
popliteal fossa is not evaluated.
 
Assessment/Plan
Assessment:
Patient is a 85-year-old intellectually challenged male with an extensive past 
medical history significant for A. fib, mitral stenosis, CAD status post CABG 
and aortic valve replacement, COPD, gout, psoriasis, HTN, HLD, OA, who presents 
after suffering an unwitnessed fall while at home. 
 
Vitals on admission: T 97.7, P 93, RR 16, /87, pulse ox 97% on room air
Labs: CO2 31, BUN 28, glucose 118, lactic acid 2.2, T bili 1.4, creatinine 
kinase 213, TSH and free T4 normal, troponin 0.04, dirty UA with mucus, ketones,
bacteria, epithelial cells
 
Problem list
#Unwitnessed fall, cannot rule out syncope given patient is a poor historian, 
radiologic imaging rule out any significant trauma
#Chronic medical problems moving CAD, A. fib, patient, HLD, COPD
 
Plan
-Admit to telemetry
-Continue sun she monitoring
-Gentle IV hydration
-Confirm medication list in a.m. with patient's visiting nursing service
-PT evaluation
-Follow-up B12
-Continue home medications
-Bedside swallow eval was passed
-Discussion was had over the phone with patient's niece Phuong who was able to 
talk directly to the patient's sister/POA.  As of this time that it was decided 
to be DNR/DNI and they also expressed a wish not to escalate care if the need 
should arise, with no ICU transfer or central line or pressors.
 
Diet: Heart healthy diet
DVT prophylaxis: Eliquis, Alps
CODE STATUS: DNR/DNI
 
As Ranked By This Provider
Problem List:
 1. Syncope and collapse
 
 2. Weakness
 
 
Core Measures/Misc (9/17)
 
Acute Coronary Syndrome
ACS Diagnosis: No
 
Congestive Heart Failure
Congestive Heart Failure Diagnosis No
 
Cerebrovascular Accident
CVA/TIA Diagnosis: No
 
VTE (View Protocol)
VTE Risk Factors Age>40
No Mechanical VTE Prophylaxis d/t N/A MechProphylax Ordered
No VTE Pharm Prophylaxis d/t NA PharmProphylax ordered
 
Sepsis (View protocol)
Sepsis Present: No
 
 
Elvis Carrasco MD 04/15/18 1707:
Attending MD Review Statement
 
Attending Statement
Attending MD Statement: examined this patient, discuss w/resident/PA/NP, agreed 
w/resident/PA/NP, reviewed EMR data (avail)
Attending Assessment/Plan:
85M PMH hypertension, hyperlipidemia, moderate mitral stenosis, history of 
bioprosthetic aortic valve replacement and LAD graft,coronary artery disease, 
status post CABG, COPD, gout, psoriasis, psoriatic arthritis was found by his 
caregiver this morning sitting on the couch instead of in bed, telling her he 
fell sometime during the night and dragged himself to the couch, and was too 
weak to get back to bed.  He reports dizziness prior to fall, but did not lose 
consciousness or hit his head, though he is not absolutely certain of this.  His
only complaint at this time is generalized weakness and diffuse myalgia.  
Imaging was normal.  Labs show mildly increased lactate and bili. Neuro exam 
normal. Plan: Admit to general medicine, gentle IV hydration, PT eval, TSH, B12 
level, continue home meds, DVT PPx
 
 
Alberto Morales 04/15/18 2104:
Resident Review Statement
Resident Statement: examined this patient, discussed with intern, agreed with 
intern, discussed with family, reviewed EMR data (avail), discussed with nursing
, discussed with case mgmt, reviewed images, amended to note
Other Findings:
Patient is a 85-year-old male with medical history of hypertension, 
hyperlipidemia, moderate mitral stenosis, history of bioprosthetic aortic valve 
replacement and LAD graft,coronary artery disease, status post CABG, COPD not on
Home O2, gout, psoriasis, psoriatic arthritis, atrial fibrillation on Eliquis.  
Presented to the emergency department as the patient was found by his caregiver 
sitting on the couch this morning and patient stated that he fell last night. 
Patient reports that he thinks that he became dizzy lightheaded and fell on to 
his knees. Patient suffered a mechanical fall last week which was witnessed at 
an adult  center.  He did not suffer any injuries that time.  He was in 
his usual state of health until prior to admission he fell in his bathroom, he 
was able to crawl to bed however he continued to feel weak.  On presentation due
to a concern off lower extremity arterial and venous insufficiency according to 
the ED staff the Doppler study came back negative for DVT and arterial study was
within acceptable limits.  Pan CT scan of the patient head, chest, abdomen, 
pelvis was done in the ED that showed incidental 2-3 mm linear opacity within 
the left upper lobe of the lung, no signs for fractures.  Patient received 1 L 
of IV fluid normal saline as his CK and lactic acid was slightly elevated. 
 
Physical examination, lab and imaging as above.
 
Problem list:
-Unwitnessed Fall/Syncope
-Unstable gait, generalized weakness
-Lactic acidosis
 
Plan:
-Admit patient to telemetry floor
-[facility] every shift, NIH, high fall risk precaution
-Serial troponin and EKG
-Echocardiogram
-Bilateral carotid ultrasound
-Obtain orthostatic
-Obtain TSH, free T4, vitamin B12
-Trend lactic acid
-TRC nebs as needed
-Continue home medication, need to confirm CMR in a.m.
-Physical therapy consultation in a.m.
-Heart healthy diet
-Pain pathway
-DVT prophylaxis: Eliquis
 
-DNI DNR 
 
-Pain pathway
-DVT prophylaxis: Eliquis
 
-DNI DNR

## 2018-04-15 NOTE — HISTORY & PHYSICAL
General Information and HPI
 
Allergies/Medications
Allergies:
Coded Allergies:
No Known Allergies (11/16/17)
 
Home Med list
Adalimumab (Humira Pen) (Unknown Strength) PEN.IJ.KIT   (Unknown Dose) SC AD 
PSORIASIS  (Reported)
Albuterol Sulfate (Ventolin Hfa) 18 GM HFA.AER.AD   2 PUF INH Q4-6 PRN PRN 
ASTHMA  (Reported)
Apixaban (Eliquis) 5 MG TABLET   5 MG PO Q12H AF
Aspirin (Ecotrin*) 81 MG TABLET.DR   1 TAB PO DAILY HEART/BLOOD  (Reported)
Clotrimazole 1 % CREAM..G.   1 TATO TOP QAMPM rash
     apply to affected area(s)
Cyanocobalamin (Vitamin B-12) (Cyanocobalamin Injection) 1,000 MCG/ML VIAL   1 
ML IM Q30D SUPPLEMENT  (Reported)
Erythromycin Base (Erythromycin) 5 MG/GRAM (0.5 %) OINT...G.   1 TATO OPH 4XDP 
blepharitis
     apply 1 cm ribbon into the lower conjunctival sac
Fluticasone/Salmeterol (Advair 250-50 Diskus) 250 MCG-50 MCG/DOSE BLST.W.DEV   1
PUF INH BID RESP.  (Reported)
Folic Acid 1 MG TABLET   1 TAB PO DAILY SUPPLEMENT  (Reported)
Furosemide 40 MG TABLET   1 TAB PO DAILY DIURETIC  (Reported)
Magnesium Oxide 400 MG TABLET   400 MG PO DAILY supplement
Meloxicam 15 MG TABLET   1 TAB PO DAILY PAIN  (Reported)
Metoprolol Succinate 50 MG TAB.ER.24H   1 TAB PO DAILY HEART  (Reported)
Nitrofurantoin Monohyd/M-Cryst (Macrobid 100 MG Capsule) 100 MG CAPSULE   1 CAP 
PO BID uti
     with food
Sertraline HCl 50 MG TABLET   1 TAB PO DAILY DEPRESSION  (Reported)
Simvastatin (Simvastatin*) 40 MG TABLET   1 TAB PO QPM CHOLESTEROL  (Reported)
 
 
Past History
 
Travel History
Traveled to [place] past 21 day No
 
Medical History
Neurological: NONE
EENT: PSORIASIS
Cardiovascular: AFIB, CAD, mitral stenosis
Respiratory: asthma
Gastrointestinal: NONE
Hepatic: NONE
Renal: NONE
Musculoskeletal: gout, OSTEOARTHRITIS
Psychiatric: NONE
Endocrine: NONE
Blood Disorders: NONE
Cancer(s): NONE
GYN/Reproductive: NONE
Other Medical Hx:
Psoriasis
History of MRSA: No
History of VRE: No
History of CDIFF: No
 
Surgical History
Surgical History: CABG
 
Past Family/Social History
 
Family History
Relations & Conditions if any
SISTER
  FH: diabetes mellitus
 
 
Psychosocial History
Who Do You Live With? self
Services at Home: Home Health Aide, Nursing
 
Functional Ability
ADLs
Independent: dressing, eating, toileting, bathing. 
Ambulation: independent
IADLs
Independent: shopping, housework, finances, food prep, telephone, transportation
, medication admin.

## 2018-04-15 NOTE — ULTRASOUND REPORT
EXAMINATION:
US TRIPLEX OF LOWER EXTREMITIES, BILATERAL
 
CLINICAL INFORMATION:
Bilateral lower leg pain and swelling. Suspected DVT.
 
COMPARISON:
None
 
TECHNIQUE:
Color-flow triplex imaging with spectral analysis and compression Doppler
were performed on the lower extremities.
 
FINDINGS:
Respiratory variation, normal compression and augmented flow are noted
throughout the lower extremities. The visualized common femoral vein,
superficial femoral vein, profunda femoral vein, popliteal vein and midcalf
peroneal and posterior tibial venous segments show no evidence of deep venous
thrombosis.
 
Please note that the study is limited by patient body habitus and mobility;
the right popliteal fossa was not evaluated.
 
There is no Baker's cyst.
 
IMPRESSION:
Normal triplex scan without evidence of deep venous thrombosis involving the
lower extremities.
 
The study is limited by patient body habitus and limited mobility. The right
popliteal fossa is not evaluated.
 
If there is a strong clinical concern regarding leg DVT, recommend follow-up
study in 5-7 days time.

## 2018-04-15 NOTE — CT SCAN REPORT
EXAMINATION:
CT CHEST, ABDOMEN AND PELVIS WITHOUT CONTRAST
 
CLINICAL INFORMATION:
85-year-old male, with unwitnessed fall. Patient is on Coumadin.
 
COMPARISON:
None.
 
TECHNIQUE:
Multidetector volumetric imaging was performed from the thoracic inlet
through the pubic symphysis without administration of any contrast.
 
FINDINGS: The evaluation is technically limited due to lack of intravenous
contrast.
 
VISUALIZED NECK: Unremarkable.
 
LUNGS: There is a linear 2 to 3 mm opacity noted at left upper lobe
anteriorly (see the key images), may represent tiny atelectatic change versus
nonspecific nodule. The remainder of the lungs fields otherwise appear clear.
Mild hypoventilatory changes are noted at both lung bases.
 
The tracheobronchial tree appeared patent.
 
MEDIASTINUM: Significant atherosclerotic disease is noted within the aorta
and its branches including mitral valve annular and aortic valve
calcifications. Postoperative changes of sternotomy and CABG is noted. There
is cardiomegaly present with predominant enlargement of the left atrium and
left ventricle.
 
PLEURA: There is no pleural effusion. No pleural mass or thickening.
 
AXILLA: No lymphadenopathy
 
LIVER, GALLBLADDER, BILIARY TREE: Unremarkable.
 
PANCREAS: Unremarkable.
 
SPLEEN: Unremarkable.
 
ADRENAL GLANDS AND KIDNEYS: Both adrenal glands are unremarkable. There are
multiple right renal cortical as well as extrahepatic hypodensities
identified. Specifically, at the superior pole of the right kidney, there is
a 3.2 cm cortical hypodensity present with Hounsfield value of 33. A
well-circumscribed oval-shaped hypodense mass is noted at superior medial
aspect of the right kidney likely represent an exophytic mass, measures 3.3
cm with Hounsfield value of 27. The largest bilobed mass is seen near the
inferior pole, measures 5.4 cm at its maximum anteroposterior dimension with
Hounsfield value varying between 15-22. These are not optimally characterized
however likely represent minimally complicated cysts.
 
Within the left kidney, there is an exophytic pericapsular hypodensity
present measures 1.8 cm at its maximum dimension with Hounsfield value of 32,
may represent minimally complicated cyst.
 
Nonemergent follow-up ultrasound of both kidneys is recommended for further
clarification.
 
URETERS AND BLADDER: Both ureters appear decompressed. The urinary bladder is
unremarkable.
 
BOWEL LOOPS: Significant fecal residual is noted within the rectum and
sigmoid colon. The appendix is visualized, appear unremarkable. The small
bowel loops are decompressed. There is a small sliding hiatal hernia present.
 
LYMPHOVASCULAR STRUCTURES: Diffuse atherosclerotic disease is noted within
the aorta and its branches without aneurysm formation. There are no
pathologically enlarged retroperitoneal, mesenteric, pelvic and/or inguinal,
groin lymphadenopathy seen.
 
PELVIS: There is no free fluid and/or free air present. There is no pelvic
mass seen.
 
BONES: No suspicious lytic or sclerotic abnormalities seen. There is no CT
evidence of any compression fracture identified within the thoracic and
lumbosacral spine. Multilevel moderate degenerative spondylosis however is
noted throughout the entire visualized thoracic and lumbosacral spine.
 
Specifically, there is no CT evidence of any intrathoracic, intra-abdominal
and/or intrapelvic hematoma identified.
 
IMPRESSION:
1. No acute intrathoracic, intra-abdominal and/or intrapelvic pathology is
identified on this nonenhanced study. Specifically, no CT evidence of any
hematoma identified within the chest abdomen and pelvis.
 
2. Incidental note is made of a tiny 2 to 3 mm linear opacity within the left
upper lobe of the lung, may represent atelectatic changes versus tiny nodule.
 
3. Multiple right and solitary left renal focal abnormalities are identified,
as described above, not optimally characterized. Follow-up nonemergent
bilateral renal ultrasound is recommended for further clarification.
 
According to the UPDATED 2017 Fleischner Society recommendations, the advised
follow-up imaging for solid nodules < 6 mm is:
LOW RISK PATIENT: No routine follow-up.
HIGH RISK PATIENT: Optional CT at 12 months.

## 2018-04-15 NOTE — RADIOLOGY REPORT
EXAMINATION:
XR KNEES, BILATERAL
 
CLINICAL INFORMATION:
Fall with bilateral knee pain
 
COMPARISON:
04/08/2017 right knee radiograph  and 06/05/2015 left knee radiograph
 
TECHNIQUE:
4 views of each knee obtained.
 
FINDINGS:
Right knee: Moderate to large knee effusion. Moderate chronic medial
compartment narrowing with tricompartmental osteophyte formation and chronic
superior subluxation of the patella. No acute fracture or dislocation is
seen. There is moderate soft tissue swelling. Vascular calcifications are
present.
 
Left knee: Moderate knee joint effusion. Severe chronic medial compartment
narrowing, with tricompartmental degenerative changes and superior patellar
subluxation, chronic. No acute fracture or acute dislocation is seen.
 
There is soft tissue swelling and vascular calcification seen.
 
IMPRESSION:
Bilateral knee arthritis most notable in the medial compartments, left worse
than right. No acute fracture or dislocation is seen. There are bilateral
joint effusions and soft tissue swelling.

## 2018-04-16 VITALS — DIASTOLIC BLOOD PRESSURE: 68 MMHG | SYSTOLIC BLOOD PRESSURE: 116 MMHG

## 2018-04-16 VITALS — DIASTOLIC BLOOD PRESSURE: 76 MMHG | SYSTOLIC BLOOD PRESSURE: 120 MMHG

## 2018-04-16 VITALS — SYSTOLIC BLOOD PRESSURE: 122 MMHG | DIASTOLIC BLOOD PRESSURE: 71 MMHG

## 2018-04-16 LAB
ABSOLUTE GRANULOCYTE CT: 4.3 /CUMM (ref 1.4–6.5)
ABSOLUTE GRANULOCYTE CT: 4.6 /CUMM (ref 1.4–6.5)
BASOPHILS # BLD: 0 /CUMM (ref 0–0.2)
BASOPHILS # BLD: 0 /CUMM (ref 0–0.2)
BASOPHILS NFR BLD: 0.1 % (ref 0–2)
BASOPHILS NFR BLD: 0.2 % (ref 0–2)
EOSINOPHIL # BLD: 0 /CUMM (ref 0–0.7)
EOSINOPHIL # BLD: 0 /CUMM (ref 0–0.7)
EOSINOPHIL NFR BLD: 0.2 % (ref 0–5)
EOSINOPHIL NFR BLD: 0.6 % (ref 0–5)
ERYTHROCYTE [DISTWIDTH] IN BLOOD BY AUTOMATED COUNT: 15 % (ref 11.5–14.5)
ERYTHROCYTE [DISTWIDTH] IN BLOOD BY AUTOMATED COUNT: 15.4 % (ref 11.5–14.5)
GRANULOCYTES NFR BLD: 68.6 % (ref 42.2–75.2)
GRANULOCYTES NFR BLD: 72.2 % (ref 42.2–75.2)
HCT VFR BLD CALC: 33.3 % (ref 42–52)
HCT VFR BLD CALC: 34.8 % (ref 42–52)
LYMPHOCYTES # BLD: 0.7 /CUMM (ref 1.2–3.4)
LYMPHOCYTES # BLD: 1 /CUMM (ref 1.2–3.4)
MCH RBC QN AUTO: 28.9 PG (ref 27–31)
MCH RBC QN AUTO: 28.9 PG (ref 27–31)
MCHC RBC AUTO-ENTMCNC: 32.6 G/DL (ref 33–37)
MCHC RBC AUTO-ENTMCNC: 32.6 G/DL (ref 33–37)
MCV RBC AUTO: 88.5 FL (ref 80–94)
MCV RBC AUTO: 88.6 FL (ref 80–94)
MONOCYTES # BLD: 0.9 /CUMM (ref 0.1–0.6)
MONOCYTES # BLD: 1 /CUMM (ref 0.1–0.6)
PLATELET # BLD: 121 /CUMM (ref 130–400)
PLATELET # BLD: 128 /CUMM (ref 130–400)
PMV BLD AUTO: 8.9 FL (ref 7.4–10.4)
PMV BLD AUTO: 9.5 FL (ref 7.4–10.4)
RED BLOOD CELL CT: 3.76 /CUMM (ref 4.7–6.1)
RED BLOOD CELL CT: 3.93 /CUMM (ref 4.7–6.1)
WBC # BLD AUTO: 6.3 /CUMM (ref 4.8–10.8)
WBC # BLD AUTO: 6.3 /CUMM (ref 4.8–10.8)

## 2018-04-16 NOTE — ULTRASOUND REPORT
EXAMINATION:
DUPLEX BILATERAL CAROTID ULTRASOUND
 
CLINICAL INFORMATION:
This is an 85-year-old male with history of syncope. Carotid artery disease.
 
COMPARISON:
None.
 
TECHNIQUE:
Real-time ultrasound and Doppler techniques (integrating B-mode 2D vascular
images, Doppler spectral analysis and color flow Doppler imaging) were
utilized to interrogate the extracranial carotid and vertebral arteries
bilaterally. The degree of stenosis determined by criteria similar to NASCET.
 
 
FINDINGS:
Right side:
1. Severe amount of heterogeneous plaque is seen in the ECA/ICA region.
2. The common carotid artery velocity is 94 cm/s.
3. The internal carotid artery velocities is not present consistent with
complete occlusion.
4. The external carotid artery velocity is 50 cm/s.
 
Left side:
1. Moderate amount of heterogeneous plaque is seen in the ECA/ICA region.
2. The common carotid artery velocity is 48 cm/s.
3. The internal carotid artery velocities are 66 cm/s systolic and 18 cm/s
diastolic.
4. The external carotid artery velocity is 134 cm/s.
 
ADDITIONAL FINDINGS:
1. The vertebral arteries show antegrade flow.
2. There is an arrhythmia present.
 
IMPRESSION:
1. RIGHT: There is complete occlusion of the right internal carotid artery.
 
2. LEFT: Minimal, nonhemodynamically significant stenosis of the proximal
left internal carotid artery corresponding to a 0-49% stenosis by velocity
criteria.
 
3. No evidence for hemodynamically significant stenosis in the external
carotid arteries.

## 2018-04-16 NOTE — ULTRASOUND REPORT
EXAMINATION:
NONINVASIVE ASSESSMENT OF THE ARTERIES OF BOTH LOWER EXTREMITIES
 
INTERPRETING VASCULAR \T\ INTERVENTIONAL RADIOLOGIST:
Jluis Sierra D.O.
 
CLINICAL INFORMATION:
85-year-old male with cold left leg in delayed cap refill.
 
TECHNIQUE:
Left lower extremity duplex ultrasound was performed with velocity
measurements and waveform analysis in the common femoral arteries, profunda
femoris arteries, proximal mid and distal superficial femoral arteries,
popliteal arteries and tibial vessels. This study was performed only at rest.
 
COMPARISON:
None
 
FINDINGS:
Multiphasic waveforms are seen throughout the left lower extremity arterial
system with the exception of the dorsalis pedis artery, where monophasic
waveforms are present. Velocities in cm/sec and phasicity as well as the
presence of plaque are reported below.
 
LEFT LEG (cm/s):
Common Femoral: 77
Profunda Femoris: 49
Proximal SFA: 73
Mid SFA: 54
Distal SFA: 65
Popliteal: 51
Anterior Tibial: 17
Posterior tibial: 39
Dorsalis pedis: 66
 
IMPRESSION:
There is no evidence of any hemodynamically significant lower extremity
arterial disease rest. Greater sensitivity and specificity can be obtained
with pre-and post exercise PVRs with LUX calculations.

## 2018-04-16 NOTE — PN- HOUSESTAFF
Chavo Winkler 18 0738:
Subjective
Follow-up For:
Syncope
Generalized weakness
Lactic acidosis resolved
Complaints: pain scale (0-10)
Tele-Events Since Last Visit:
Tele events uneventful
Atrial fibrillation, rate varying between 80-90, 
Subjective:
Patient was seen and examined this morning.  He is alert awake and oriented to 
time place and person.  No acute events noticed.
 
Patient denies any further episodes of syncope, dizzy or lightheadedness.  
Denies loss of consciousness.  No chest pain, short of breath, fever, chills, 
palpitations.
 
Telemetry events noticed, uneventful
Vitals afebrile, heart rate 90, respiratory 16, blood pressure 120/87, 
saturating at 97 on room air
 
Review of Systems
Constitutional:
Reports: see HPI. 
 
Objective
Last 24 Hrs of Vital Signs/I&O
 Vital Signs
 
 
Date Time Temp Pulse Resp B/P B/P Pulse O2 O2 Flow FiO2
 
     Mean Ox Delivery Rate 
 
 0332      96 Room Air  
 
 0332 98.3 87 19 122/71  96 Room Air  
 
 0224 97.3 85 18 125/70  96 Room Air  
 
04/15 2044 98.0 97 18 103/70  95 Room Air  
 
04/15 1804 98.2 92 18 121/68  97 Room Air  
 
04/15 1408 98.2 86 16 102/66  96 Room Air  
 
04/15 1242      98 Room Air  
 
04/15 1202 97.7 93 16 121/87  97 Room Air  
 
 
 Intake & Output
 
 
  1600  0800  0000
 
Intake Total  456 500
 
Output Total   200
 
Balance  456 300
 
    
 
Intake, IV  216 500
 
Intake, Oral  240 
 
Output, Urine   200
 
Patient  124.5 kg 
 
Weight   
 
Weight  Bed scale 
 
Measurement   
 
Method   
 
 
 
 
Physical Exam
General Appearance: Alert, Oriented X3, Cooperative
Other Physical Findings:
 
General Appearance Alert, Cooperative, No Acute Distress, oriented to person, 
not time or place
Skin Temp/Moisture Exam: Warm/Dry
Sepsis Skin Exam (color): Normal for Ethnicity
Cardiovascular distant heart sounds, irregularly irregular rhythm, systolic 2/6 
murmur
Lungs Clear to Auscultation, Normal Air Movement
Abdomen Normal Bowel Sounds, Soft, No Tenderness
Neurological Normal Speech, Normal Tone, Sensation Intact, Cranial Nerves 3-12 
NL, patient was not compliant with lower extremity portion of neuro exam 
secondary to knee pain
Extremities No Clubbing, No Cyanosis, 1-2 + BLE edema
Last 24 Hrs of Labs/Dusty:
Current Medications:
 Current Medications
 
 
  Sig/Angela Start time  Last
 
Medication Dose Route Stop Time Status Admin
 
Acetaminophen 650 MG Q6P PRN 04/15 1715 AC 
 
  PO   
 
Albuterol Sulfate 2 PUF Q4-6 PRN PRN 04/15 1830 AC 
 
  INH   
 
Apixaban 5 MG BID  0900 AC 
 
  PO   0910
 
Apixaban 5 MG Q12H 04/15 1830 DC 04/15
 
  PO   2329
 
Aspirin Buffered 81 MG DAILY  1108 AC 
 
  PO   
 
Atorvastatin Calcium 20 MG 1700  1700 AC 
 
  PO   
 
Budesonide/ 2 PUF BID 04/15 2100 AC 
 
Formoterol Fumarate  INH   0911
 
Docusate Sodium 100 MG DAILY  0900 AC 
 
  PO   0909
 
Hydrocodone Bitart/ 1 TAB Q8P PRN 04/15 1730 AC 
 
Acetaminophen  PO   0515
 
Metoprolol Succinate 50 MG DAILY  0900 AC 
 
  PO   
 
Oxybutynin Chloride 5 MG TID  0900 AC 
 
  PO   
 
Sertraline HCl 50 MG DAILY 04/15 1817 AC 
 
  PO   0909
 
Sodium Chloride 1,000 ML Q13H 04/15 2115 DC 04/15
 
  IV  1014  2352
 
Sodium Chloride 500 ML BOLUS ONE 04/15 1430 DC 04/15
 
  IV 04/15 1529  1518
 
 
 
 
Last 24 Hrs of Lab/Dusty Results
Last 24 Hrs of Labs/Mics:
 Laboratory Tests
 
18 0416:
Anion Gap 8, Estimated GFR > 60, BUN/Creatinine Ratio 28.9  H, CBC w Diff NO MAN
DIFF REQ, RBC 3.76  L, MCV 88.6, MCH 28.9, MCHC 32.6  L, RDW 15.0  H, MPV 9.5, 
Gran % 72.2, Lymphocytes % 11.4  L, Monocytes % 16.1  H, Eosinophils % 0.2, 
Basophils % 0.1, Absolute Granulocytes 4.6, Absolute Lymphocytes 0.7  L, 
Absolute Monocytes 1.0  H, Absolute Eosinophils 0, Absolute Basophils 0
 
18 0223:
Troponin I 0.06
 
04/15/18 2034:
Troponin I 0.05, Vitamin B12 712
 
04/15/18 1557:
Lactic Acid 2.0
 
04/15/18 1325:
Urinalysis LIGHT  H, Urine Color YEL, Urine Clarity CLEAR, Urine pH 6.0, Ur 
Specific Gravity 1.025, Urine Protein TRACE  H, Urine Ketones TRACE  H, Urine 
Nitrite NEG, Urine Bilirubin NEG, Urine Urobilinogen 0.2, Ur Leukocyte Esterase 
NEG, Ur Microscopic SEDIMENT EXAMINED, Urine RBC 1-3, Urine WBC RARE, Ur 
Epithelial Cells MOD  H, Urine Bacteria RARE  H, Urine Mucus MOD  H, Urine 
Hemoglobin NEG, Urine Glucose NEG
 
04/15/18 1237:
Anion Gap 12, Estimated GFR > 60, BUN/Creatinine Ratio 31.1  H, Glucose 118  H, 
Lactic Acid 2.2  H, Calcium 9.1, Phosphorus 3.4, Magnesium 2.3, Total Bilirubin 
1.4  H, AST 29, ALT 22, Alkaline Phosphatase 82, Creatine Kinase 213  H, 
Troponin I 0.04, Total Protein 7.4, Albumin 4.2, Globulin 3.2, Albumin/Globulin 
Ratio 1.3, TSH 3.030, Free T4 1.17, PT 18.9  H, INR 1.72  H, APTT 39  H, CBC w 
Diff NO MAN DIFF REQ, RBC 4.67  L, MCV 89.0, MCH 29.0, MCHC 32.6  L, RDW 15.6  H
, MPV 8.5, Gran % 74.4, Lymphocytes % 11.7  L, Monocytes % 13.2  H, Eosinophils 
% 0.3, Basophils % 0.4, Absolute Granulocytes 5.7, Absolute Lymphocytes 0.9  L, 
Absolute Monocytes 1.0  H, Absolute Eosinophils 0, Absolute Basophils 0
 Microbiology
 0323  UPPER RESP: Surveillance Culture - RECD
 0323  GI: Surveillance Culture - RECD
04/15 2300  NASOPHARYN: Influenza Virus A & B Rapid Smear - COMP
 
 
 
Assessment/Plan
Assessment:
Patient is a 85-year-old intellectually challenged male with an extensive past 
medical history significant for A. fib on eliqus, heart failure with reduced 
ejection fraction, mitral stenosis, prosthetic aortic valve, CAD status post 
CABG and aortic valve replacement, COPD/asthma, gout, osteoarthritis, psoriasis,
HTN, HLD, OA, who presents after suffering an unwitnessed fall while at home. 
 
 
 
Vitals on admission: T 97.7, P 93, RR 16, /87, pulse ox 97% on room air
Labs: CO2 31, BUN 28, glucose 118, lactic acid 2.2, T bili 1.4, creatinine 
kinase 213, TSH and free T4 normal, troponin 0.04, dirty UA with mucus, ketones,
bacteria, epithelial cells
 
echo 2016
No  obvious regional wall motion abnormalities. Normal left ventricular  
ejection fraction estimated at 55-60%. Left ventricular wall  thickness 
increased. 
 
Lower extremity venous Doppler negative for DVT
Arterial ultrasound lower extremity no peripheral arterial disease
Head CT and cervical spine CT no acute abnormality was found
Knee x-ray- Bilateral knee arthritis most notable in the medial compartments, 
left worse
than right. No acute fracture or dislocation is seen. There are bilateral
joint effusions and soft tissue swelling.
Chest CT -  Incidental note is made of a tiny 2 to 3 mm linear opacity within 
the left
upper lobe of the lung, may represent atelectatic changes versus tiny nodule.
 
 
--------------------------------------------------------------------------------
-------------
Problem list
#Unwitnessed fall, cannot rule out syncope given patient is a poor historian, 
radiologic imaging rule out any significant trauma
#Chronic medical problems moving CAD, A. fib, patient, HLD, COPD
 
 
Unwitnessed fall/questionable syncope
patient presents after suffering an unwitnessed fall while at home.  Patient is 
a poor historian and much of the history was obtained from his niece who 
participates in his care.  Patient suffered a mechanical fall last week which 
was witnessed at an adult  center.  He did not suffer any injuries that 
time.  He was in his usual state of health until prior to admission he fell in 
his bathroom, he was able to crawl to bed however he continued to feel weak.  He
was found by his home health aide called [phone].  Patient states that he slipped 
and fell and that he lost consciousness for niece is not a reliable historian.  
He endorses lightheadedness preceding the fall.  He denied any associated chest 
pain, shortness breath, vomiting, diaphoresis.
 
* Tele hold in ICU
* Continuous telemetry monitoring
* Monitor vitals every shift
* Serial troponin and EKG negative
* EKG showed atrial fibrillation, rate under control no acute ST-T wave changes
* No arrhythmias were found other than his atrial fibrillation
* Will follow up orthostatic vitals
* Ruled out vasovagal syncope
* No hypoglycemic episodes
* No history of seizures
* We will continue gentle IV hydration
* Physical therapy on board
 
 
Generalized weakness
Patient and his family reports generalized weakness.
Physical therapy on board
Recommended short-term rehabilitation
B12 and thyroid numbers were normal 
Of note patient is getting vitamin D 50,000 international unit capsule every 
month and vitamin B12 injection every month
 
 
Lactic acidosis
Lactic acid 2.2 at the time of admission
Resolved with gentle hydration
 
 
 
History of coronary artery disease continue baby aspirin
Hyperlipidemia continue simvastatin
History of atrial fibrillation continue eliqus 5 twice daily and metoprolol 
succinate 50 daily
Hypertension continue metoprolol succinate with holding parameters
Chronic heart failure with reduced ejection fraction Hold Lasix for now given 
syncopal episode
Psoriasis gets adalimumab 40 mg injection every 2 weeks
Asthma continue TRC nebs
Overactive bladder continue oxybutynin
Depression continue Zoloft
 
 
 
Bedside swallow eval was passed
Discussion was had over the phone with patient's niece Phuong who is able to 
talk directly to the patient's sister/POA.  As of this time that it was decided 
to be DNR/DNI and they also expressed a wish not to escalate care if the need 
should arise, with no ICU transfer or central line or pressors.
 
 
Diet: Heart healthy diet
DVT prophylaxis: Eliquis, Alps
CODE STATUS: DNR/DNI
Problem List:
 1. Syncope and collapse
 
 2. Weakness
 
Pain Ratin
Pain Location:
b/l knee
Pain Goal: Remain pain free
Pain Plan:
tylinol
 
Tomorrow's Labs & Rationales:
cbc
bep
 
 
Elvis Carrasco MD 18 1117:
Attending MD Review Statement
 
Attending Statement
Attending MD Statement: examined this patient, discuss w/resident/PA/NP, agreed 
w/resident/PA/NP, reviewed EMR data (avail)
Attending Assessment/Plan:
85M PMH hypertension, hyperlipidemia, moderate mitral stenosis, history of 
bioprosthetic aortic valve replacement and LAD graft,coronary artery disease, 
status post CABG, COPD, gout, psoriasis, psoriatic arthritis was found by his 
caregiver this morning sitting on the couch instead of in bed, telling her he 
fell sometime during the night and dragged himself to the couch, and was too 
weak to get back to bed.  He reports dizziness prior to fall, but did not lose 
consciousness or hit his head, though he is not absolutely certain of this.  His
only complaint at this time is generalized weakness and diffuse myalgia.  
Imaging was normal.  Labs show mildly increased lactate and bili. Neuro exam 
normal. 
 
Still weak today but no active complaints.  No telemetry events.  Lactate 
normalized.
 
1. Unwitnessed fall
2. Lactic acidosis
3. Dehydration
 
Plan
- Continue on telemetry
- Continue gentle hydration
- Follow cardiology recommendations
- Repeat echocardiogram
- Follow urine culture
- PT eval
- Continue home medications
- DVT PPx

## 2018-04-16 NOTE — EVENT NOTE
Event Note
Event Note:
This is an 85-year-old male with admitted with syncope.  As a part of syncope 
workup, carotid vertebral Doppler ultrasound was done this afternoon which 
showed 
 
 
1. RIGHT: There is complete occlusion of the right internal carotid artery.
2. LEFT: Minimal, nonhemodynamically significant stenosis of the proximal left 
internal carotid artery corresponding to a 0-49% stenosis by velocity criteria.
 3. No evidence for hemodynamically significant stenosis in the external carotid
arteries.
 
* Notified Dr. Carrasco.
* vascular surgery was contacted.
* Requested stat call back.
* Spoke with Dr. Barba vascular surgeon over phone regarding complete 
occlusion of right carotid artery.
* CAT scan head was done on 04/15/2018, no acute intracranial pathology no acute
infarcts were found. Specifically, no evidence of intra-axial mass, mass effect,
extra-axial fluid collection, midline shift, acute intraparenchymal hemorrhage 
and/or acute infarction present.
* Given no stroke, Dr. Barba recommended to follow-up closely for now with 
no acute interventions
* Vascular surgeon is going to follow-up with him tomorrow.

## 2018-04-17 VITALS — SYSTOLIC BLOOD PRESSURE: 110 MMHG

## 2018-04-17 VITALS — SYSTOLIC BLOOD PRESSURE: 110 MMHG | DIASTOLIC BLOOD PRESSURE: 90 MMHG

## 2018-04-17 VITALS — DIASTOLIC BLOOD PRESSURE: 80 MMHG | SYSTOLIC BLOOD PRESSURE: 120 MMHG

## 2018-04-17 LAB
ABSOLUTE GRANULOCYTE CT: 3.6 /CUMM (ref 1.4–6.5)
BASOPHILS # BLD: 0 /CUMM (ref 0–0.2)
BASOPHILS NFR BLD: 0.1 % (ref 0–2)
EOSINOPHIL # BLD: 0 /CUMM (ref 0–0.7)
EOSINOPHIL NFR BLD: 0.8 % (ref 0–5)
ERYTHROCYTE [DISTWIDTH] IN BLOOD BY AUTOMATED COUNT: 14.5 % (ref 11.5–14.5)
GRANULOCYTES NFR BLD: 65.4 % (ref 42.2–75.2)
HCT VFR BLD CALC: 31.3 % (ref 42–52)
LYMPHOCYTES # BLD: 1.1 /CUMM (ref 1.2–3.4)
MCH RBC QN AUTO: 28.4 PG (ref 27–31)
MCHC RBC AUTO-ENTMCNC: 32 G/DL (ref 33–37)
MCV RBC AUTO: 88.7 FL (ref 80–94)
MONOCYTES # BLD: 0.7 /CUMM (ref 0.1–0.6)
PLATELET # BLD: 121 /CUMM (ref 130–400)
PMV BLD AUTO: 8.7 FL (ref 7.4–10.4)
RED BLOOD CELL CT: 3.53 /CUMM (ref 4.7–6.1)
WBC # BLD AUTO: 5.4 /CUMM (ref 4.8–10.8)

## 2018-04-17 NOTE — DISCHARGE SUMMARY
Visit Information
 
Visit Dates
Admission Date:
04/15/18
 
Discharge Date:
4/20/2018
 
Hospital Course
 
Course
Attending Physician:

 
Primary Care Physician:
Dorothea Delcid
 
Hospital Course:
Patient is a 85-year-old male with an extensive past medical history significant
for A. fib on eliqus, heart failure with reduced ejection fraction, mitral 
stenosis, prosthetic aortic valve, CAD status post CABG and aortic valve 
replacement, COPD/asthma, gout, osteoarthritis, psoriasis, HTN, HLD, OA, 
presented after suffering an unwitnessed fall while at home. 
 
Vitals on admission: T 97.7, P 93, RR 16, /87, pulse ox 97% on room air
 
Labs: CO2 31, BUN 28, glucose 118, lactic acid 2.2, T bili 1.4, creatinine 
kinase 213, TSH and free T4 normal, troponin 0.04, dirty UA with mucus, ketones,
bacteria, epithelial cells
 
Echo October 2016
No  obvious regional wall motion abnormalities. Normal left ventricular  
ejection fraction estimated at 55-60%. Left ventricular wall  thickness 
increased. 
 
Lower extremity venous Doppler negative for DVT
Arterial ultrasound lower extremity no peripheral arterial disease
Head CT and cervical spine CT no acute abnormality was found
Knee x-ray- Bilateral knee arthritis most notable in the medial compartments, 
left worse
than right. No acute fracture or dislocation is seen. There are bilateral
joint effusions and soft tissue swelling.
Chest CT -  Incidental note is made of a tiny 2 to 3 mm linear opacity within 
the left
upper lobe of the lung, may represent atelectatic changes versus tiny nodule.
 
Carotid doppler- 1. RIGHT: There is complete occlusion of the right internal 
carotid artery.
 2. LEFT: Minimal, nonhemodynamically significant stenosis of the proximalleft 
internal carotid artery corresponding to a 0-49% stenosis by velocity criteria. 
3. No evidence for hemodynamically significant stenosis in the external
carotid arteries.
 
--------------------------------------------------------------------------------
-------------
 
 
#Unwitnessed fall/questionable syncope:
The patient presented after suffering an unwitnessed fall while at home.  
Patient is a poor historian and much of the history was obtained from his niece 
who participates in his care.  Patient suffered a mechanical fall last week 
which was witnessed at an adult  center.  He did not suffer any injuries 
that time.  He was in his usual state of health until prior to admission he fell
in his bathroom, he was able to crawl to bed however he continued to feel weak. 
He was found by his home health aide called [phone].  Patient stated that he slipped
and fell and that he lost consciousness He endorsed lightheadedness preceding 
the fall.  He denied any associated chest pain, shortness breath, vomiting, 
diaphoresis.
 
He was monitored on telemetry. No arrhythmia expect baseline atrial 
fibrillation. Vitals remained stable throughout the hospital stay.  Serial 
troponin and EKG were negative.  EKG showed atrial fibrillation, rate under 
control, no acute ST-T wave changes. 
 
 
#Generalized weakness: Physical therapy consult was placed who recommended short
-term rehabilitation. B12 and thyroid numbers were normal. Of note patient is on
vitamin D 50,000 international unit capsule every month and vitamin B12 
injection every month as an outpatient.
 
#Lactic acidosis:
Lactic acid 2.2 at the time of admission which resolved with gentle hydration.
 
#Bilateral knee pain:
Patient reported 10 out of 10 bilateral knee pain, tender to touch.  Bilateral 
knee x-ray was done which showed Bilateral knee arthritis most notable in the 
medial compartments, left worse
than right. No acute fracture or dislocation is seen. There are bilateral joint 
effusions and soft tissue swelling.He has no fever, leukocytosis.  However given
his history of psoriasis, being on adalimumab we cannot really rely on his fever
or leukocytosis trend.  Uric acid was 6.1.  Less likely gout. Most possibly 
bilateral knee osteoarthritis, end-stage, flare up from recent fall s/p crawling
on the floor with his knees. Orthopedic consult was recommended to rule out any 
septic joint.
continued Tylenol scheduled for pain management.  The patient underwent 
arthrocentesis on 04/18/2018, which revealed hemarthrosis, samples were sent for
cultures(negative), crystals, and cell count.  Per Ortho, most likely effusion 
is a result of DJD.  They recommended Gait training with a walker (WBA T).
 
#Hemarthrosis: Noted per patient underwent bilateral arthrocentesis, both 
cardiology and neurology were consulted and were in agreement to stop 
anticoagulation(Apixaban) and maintain the patient on aspirin only.  
 
#Right carotid artery complete occlusion:
carotid doppler- 1. RIGHT: There is complete occlusion of the right internal 
carotid artery.
 2. LEFT: Minimal, nonhemodynamically significant stenosis of the proximalleft 
internal carotid artery corresponding to a 0-49% stenosis by velocity criteria. 
3. No evidence for hemodynamically significant stenosis in the external carotid 
arteries.
 
Vascular surgery was consulted given his history of syncope and carotid 
occlusion. Family doesn't want any further escalation of care.  Additionally in 
the setting of carotid occlusion no surgical intervention is recommended. 
Recommended outpatient follow-up for surveillance of left internal carotid 
artery
 
#Bruise back of left thigh:
Patient has Bruise on the back of his left thigh status post fall.  The borders 
were marked and has remained stable. continued to monitor H&H.  
 
#History of coronary artery disease Continued baby aspirin
#Hyperlipidemia: Continued on statin
#History of atrial fibrillation: Discontinued apixaban as above.  Continued 
metoprolol succinate 50 daily
#Hypertension: Continued metoprolol succinate with holding parameters
Chronic heart failure with reduced ejection fraction: Initially Lasix was held 
given syncopal episode.  The patient was restarted on Lasix 20 mg twice a day 
per cardiology recommendations on 04/19/2018.
#Psoriasis: On adalimumab 40 mg injection every 2 weeks
#Overactive bladder: Continued oxybutynin
#Depression: Continued Zoloft
 
Allergies:
Coded Allergies:
No Known Allergies (11/16/17)
 
Significant Procedures:
Arthrocentesis on 04/18/2018, ruled out joint.
 
Echocardiogram 04/16/2018:
 
FINDINGS 
 
 Left Ventricle 
 LVH, no regional wall motion abnormalities. apex not well  
 visualized. 
 EF estimated 55%. 
 
 Right Ventricle 
 normal size and function. 
 
 Right Atrium 
 normal dimension. 
 
 Left Atrium 
 At least moderately dilated. interatrial septum not well visualized  
 but no apparent PFO. 
 
 Mitral Valve 
 calcification of annulus and leaflets. good excursion of leaflets.  
 minimal gradient across mitral valve. 
 trace MR. 
 
 Aortic Valve 
 Sclerosis without significant stenosis, no significant transaortic  
 gradient. No leak visualized with limitation of poor view. 
 
 Tricuspid Valve 
 normal morphologhy, no TR appreciated. 
 
 Pulmonic Valve 
 Normal morphology, no LA appreciated. 
 
 Pericardium 
 no pericardial effusion. 
 
 Great Vessels 
 visualized segments appear normal. 
 
 CONCLUSIONS 
 LVH, no regional wall motion abnormalities. 
 [place] not well visualized. 
 
 EF estimated 55%. 
 Normal size and function of right ventricle. 
 At least moderately dilated left atrium. 
 severe calcification of mitral annulus and leaflets. 
 Good excursion of mitral leaflets. 
 Minimal gradient across mitral valve. 
 
 trace MR. 
 aortic valve sclerosis without significant stenosis, no significant  
 transaortic gradient. 
 
 Kelsy Armas M.D. 
 (Electronically Signed) 
 Final Date:      17 April 2018  
 
 
Carotid Doppler ultrasound:04/16/18
 
FINDINGS:
Right side:
1. Severe amount of heterogeneous plaque is seen in the ECA/ICA region.
2. The common carotid artery velocity is 94 cm/s.
3. The internal carotid artery velocities is not present consistent with
complete occlusion.
4. The external carotid artery velocity is 50 cm/s.
 
Left side:
1. Moderate amount of heterogeneous plaque is seen in the ECA/ICA region.
2. The common carotid artery velocity is 48 cm/s.
3. The internal carotid artery velocities are 66 cm/s systolic and 18 cm/s
diastolic.
4. The external carotid artery velocity is 134 cm/s.
 
ADDITIONAL FINDINGS:
1. The vertebral arteries show antegrade flow.
2. There is an arrhythmia present.
 
IMPRESSION:
1. RIGHT: There is complete occlusion of the right internal carotid artery.
 
2. LEFT: Minimal, nonhemodynamically significant stenosis of the proximal
left internal carotid artery corresponding to a 0-49% stenosis by velocity
criteria.
 
3. No evidence for hemodynamically significant stenosis in the external
carotid arteries.
Pertinent Lab Results:
 
----------------------------------------------------------------------
Arterial Doppler
IMPRESSION:
There is no evidence of any hemodynamically significant lower extremity
arterial disease rest. Greater sensitivity and specificity can be obtained
with pre-and post exercise PVRs with LUX calculations.
 
--------------------------------------------------------------------------------
----
 
FINDINGS:
Right knee: Moderate to large knee effusion. Moderate chronic medial
compartment narrowing with tricompartmental osteophyte formation and chronic
superior subluxation of the patella. No acute fracture or dislocation is
seen. There is moderate soft tissue swelling. Vascular calcifications are
present.
 
Left knee: Moderate knee joint effusion. Severe chronic medial compartment
narrowing, with tricompartmental degenerative changes and superior patellar
subluxation, chronic. No acute fracture or acute dislocation is seen.
 
There is soft tissue swelling and vascular calcification seen.
 
IMPRESSION:
Bilateral knee arthritis most notable in the medial compartments, left worse
than right. No acute fracture or dislocation is seen. There are bilateral
joint effusions and soft tissue swelling.
 
-----------------------------------------------------------------------------
head CT and cervical spine CT
IMPRESSION:
1. No acute intracranial pathology.
 
2. No CT evidence of any acute fracture no subluxation or dislocation or
prespinal soft tissue hematoma present at the cervical spine.
 
3. No significant change since most recent prior CT of the head and cervical
spine dated 02/03/2016. Specifically, no CT evidence of any acute
intracranial hemorrhage is seen.
 -------------------------------------------------------------
IMPRESSION:
1. No acute intrathoracic, intra-abdominal and/or intrapelvic pathology is
identified on this nonenhanced study. Specifically, no CT evidence of any
hematoma identified within the chest abdomen and pelvis.
 
2. Incidental note is made of a tiny 2 to 3 mm linear opacity within the left
upper lobe of the lung, may represent atelectatic changes versus tiny nodule.
 
3. Multiple right and solitary left renal focal abnormalities are identified,
as described above, not optimally characterized. Follow-up nonemergent
bilateral renal ultrasound is recommended for further clarification.
 
According to the UPDATED 2017 Fleischner Society recommendations, the advised
follow-up imaging for solid nodules < 6 mm is:
LOW RISK PATIENT: No routine follow-up.
HIGH RISK PATIENT: Optional CT at 12 months.
--------------------------------------------------------------------------------
---
 
 Laboratory Tests
 
 
 04/19
 
 0615
 
Chemistry 
 
  Sodium (137 - 145 mmol/L) 135  L
 
  Potassium (3.5 - 5.1 mmol/L) 4.3
 
  Chloride (98 - 107 mmol/L) 98
 
  Carbon Dioxide (22 - 30 mmol/L) 28
 
  Anion Gap (5 - 16) 9
 
  BUN (9 - 20 mg/dL) 20
 
  Creatinine (0.7 - 1.2 mg/dL) 0.8
 
  Estimated GFR (>60 ml/min) > 60
 
  BUN/Creatinine Ratio (7 - 25 %) 25.0
 
  Magnesium (1.6 - 2.3 mg/dL) 2.2
 
Hematology 
 
  CBC w Diff NO MAN DIFF REQ
 
  WBC (4.8 - 10.8 /CUMM) 5.4
 
  RBC (4.70 - 6.10 /CUMM) 3.40  L
 
  Hgb (14.0 - 18.0 G/DL) 10.1  L
 
  Hct (42 - 52 %) 30.2  L
 
  MCV (80.0 - 94.0 FL) 88.7
 
  MCH (27.0 - 31.0 PG) 29.5
 
  MCHC (33.0 - 37.0 G/DL) 33.3
 
  RDW (11.5 - 14.5 %) 14.5
 
  Plt Count (130 - 400 /CUMM) 154
 
  MPV (7.4 - 10.4 FL) 9.6
 
  Gran % (42.2 - 75.2 %) 70.1
 
  Lymphocytes % (20.5 - 51.1 %) 17.3  L
 
  Monocytes % (1.7 - 9.3 %) 10.8  H
 
  Eosinophils % (0 - 5 %) 1.2
 
  Basophils % (0.0 - 2.0 %) 0.6
 
  Absolute Granulocytes (1.4 - 6.5 /CUMM) 3.8
 
  Absolute Lymphocytes (1.2 - 3.4 /CUMM) 0.9  L
 
  Absolute Monocytes (0.10 - 0.60 /CUMM) 0.6
 
  Absolute Eosinophils (0.0 - 0.7 /CUMM) 0.1
 
  Absolute Basophils (0.0 - 0.2 /CUMM) 0
 
 
 
 
Disposition Summary
 
Disposition
Principal Diagnosis:
Syncope
Generalized weakness
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
Bilateral knee effusion, bilateral knee pain, knee arthritis
Additional Diagnosis:
Hemarthrosis-bilateral
Discharge Disposition: SNF
 
Discharge Instructions
 
General Discharge Information
Code Status: Do Not Resucitate/Intubat
Patient's Diet:
Heart healthy
Patient's Activity:
As tolerated/walker
Follow-Up Instructions/Appts:
-Please follow-up with PCP in one week after discharge.
-Please follow up with vascular surgeon, Dr. Hare in 1 week after discharge.
-Please follow-up with cardiologist, Dr. Armas in 1 week after discharge.
-Please follow-up with orthopedics Dr. Hollis in 1 week after discharge.
-Please follow up with Neurologist, Dr. Parry in 1 week after discharge.
 
 
Medications at Discharge
Discharge Medications:
Stop taking the following medications:
Magnesium Oxide (Magnesium Oxide) 400 MG TABLET ORAL DAILY Qty = 4
 
Apixaban (Eliquis) 5 MG TABLET ORAL Q12H Days = 30
 
Furosemide (Furosemide) 40 MG TABLET ORAL DAILY Qty = 90
 
Lanolin Alcohol/Mo/W.pet/[place] (Eucerin Creme) 454 GM CREAM..G. On the skin  as 
needed for SKIN 
 
Fesoterodine Fumarate (Toviaz) 4 MG TAB.ER.24H ORAL DAILY 
 
Continue taking these medications:
Aspirin (Ecotrin*) 81 MG TABLET.
    1 Tablet ORAL DAILY
    Comments:
       Last Taken:4/20/18
             Time:8 AM
 
Simvastatin (Simvastatin*) 40 MG TABLET
    1 Tablet ORAL Every night
    Comments:
       Last Taken:4/20/18
             Time:3 PM
 
Folic Acid (Folic Acid) 1 MG TABLET
    1 Tablet ORAL DAILY
    Comments:
       Last Taken:NOT GIVEN IN  HOSPITAL
             Time:
 
Fluticasone/Salmeterol (Advair 250-50 Diskus) 250 MCG-50 MCG/DOSE BLST.W.DEV
    1 Puff Inhale through mouth TWICE DAILY
    Comments:
       Last Taken:4/20/18
             Time:8 AM SYMBICORT GIVEN IN THE HOSPITAL
 
Albuterol Sulfate (Ventolin Hfa) 18 GM HFA.AER.AD
    2 Puff Inhale through mouth EVERY 4-6 HOURS AS NEEDED as needed for ASTHMA
    Comments:
       Last Taken:3/13/17
             Time: 9:25A.M
 
Metoprolol Succinate (Metoprolol Succinate) 50 MG TAB.ER.24H
    1 Tablet ORAL DAILY
    Comments:
       Last Taken:4/20/18
             Time:8 AM
 
Adalimumab (Humira Pen) 40 MG/0.8 ML PEN.IJ.KIT
    40 Milligram Inject into fatty tissue EVERY 2 WEEKS
    Qty = 4
 
Cyanocobalamin (Vitamin B-12) (Cyanocobalamin Injection) 1,000 MCG/ML VIAL
    1 Milliliters INTRAMUSC ONCE A MONTH
    Comments:
       Last Taken:NOT GIVEN IN THE HOSPITAL
             Time:
 
Sertraline HCl (Sertraline HCl) 50 MG TABLET
    1 Tablet ORAL DAILY
    Qty = 30
    Comments:
       Last Taken:4/20/18
             Time:8 AM
 
Clotrimazole (Clotrimazole) 1 % CREAM..G.
    1 Application On the skin Every Morning-Night
    Qty = 15
    Instructions:
       apply to affected area(s)
    Comments:
       Last Taken:NOT GIVEN IN THE HOSPITAL
             Time:
 
Albuterol Sulfate (Albuterol Sulfate) 2.5 MG/3 ML (0.083 %) VIAL.NEB
    1 Vial Inhale Solution THREE TIMES DAILY as needed for RESP.
    Comments:
       Last Taken:NOT GIVEN IN THE HOSPITAL
             Time:
 
Docusate Sodium (Colace) 100 MG CAPSULE
    1 Capsule ORAL DAILY
    Comments:
       Last Taken:4/20/18
             Time:8 AM
 
Sennosides (Senna) 8.6 MG TABLET
    2 Tablet ORAL TAKE AT BEDTIME
    Comments:
       Last Taken:NOT GIVEN IN THE HOSPITAL
             Time:
 
Triamcinolone Acetonide (Triamcinolone Acetonide) 0.1 % OINT...G.
    1 Application On the skin As Directed
    Comments:
       Last Taken:NOT GIVEN IN THE HOSPITAL
             Time:
 
Ergocalciferol (Vitamin D2) (Vitamin D2) 50,000 UNIT CAPSULE
    1 Capsule ORAL ONCE A MONTH
    Comments:
       Last Taken:NOT GIVEN IN THE HOSPITAL
             Time:
 
Tolterodine Tartrate (Detrol LA) 4 MG CAP.ER.24H
    1 Capsule ORAL DAILY
    Comments:
       Last Taken:NOT GIVN IN THE HOSPITAL
             Time:
 
Oxybutynin Chloride (Oxybutynin Chloride ER) 15 MG TAB.ER.24
    1 Tablet ORAL DAILY
    Qty = 30
    Comments:
       Last Taken:4/20/18
             Time:1 PM
 
Start taking the following new medications:
Acetaminophen (Tylenol) 325 MG TABLET
    2 Tablet ORAL EVERY SIX HOURS
    Qty = 40
    No Refills
    Comments:
       Last Taken:4/20/18
             Time:12 NOON
 
Furosemide (Furosemide) 20 MG TABLET
    1 Tablet ORAL 7:30AM & 4:30PM
    Qty = 60
    No Refills
    Comments:
       Last Taken:4/20/18
             Time:3 PM
 
 
Copies To:
Dorothea Delcid
 
Attending MD Review Statement
Documenting Attending:
Julienne LORENZO,Megha

## 2018-04-17 NOTE — CONS- VASCULAR SURGERY
General Information and HPI
 
Consulting Request
Date of Consult: 04/17/18
Requested By:
Elvis Carrasco MD
 
Reason for Consult:
Syncope
Source of Information: patient, old records
Exam Limitations: poor historian
History of Present Illness:
85-year-old male with multiple medical problems who was brought into the 
hospital with complaint of syncope.  Patient is also reporting pain and 
discomfort when moving his lower extremities.  He does recall the event and 
states that this is the first time occurred.  He is currently being worked up 
for syncope and carotid ultrasonography demonstrates a R. carotid occlusion in 
addition to mild L. carotid stenosis.  He denies any visual changes or aphasia.
 
Allergies/Medications
Allergies:
Coded Allergies:
No Known Allergies (11/16/17)
 
Home Med List:
Adalimumab (Humira Pen) 40 MG/0.8 ML PEN.IJ.KIT   40 MG SC Q2W PSORIASIS  (
Reported)
Albuterol Sulfate (Ventolin Hfa) 18 GM HFA.AER.AD   2 PUF INH Q4-6 PRN PRN 
ASTHMA  (Reported)
Albuterol Sulfate 2.5 MG/3 ML (0.083 %) VIAL.NEB   1 Vial INH/SOL TID PRN RESP. 
(Reported)
Apixaban (Eliquis) 5 MG TABLET   5 MG PO Q12H AF
Aspirin (Ecotrin*) 81 MG TABLET.DR   1 TAB PO DAILY HEART/BLOOD  (Reported)
Clotrimazole 1 % CREAM..G.   1 TATO TOP QAMPM rash
     apply to affected area(s)
Cyanocobalamin (Vitamin B-12) (Cyanocobalamin Injection) 1,000 MCG/ML VIAL   1 
ML IM Q30D SUPPLEMENT  (Reported)
Docusate Sodium (Colace) 100 MG CAPSULE   1 CAP PO DAILY STOOL SOFTENER  (
Reported)
Ergocalciferol (Vitamin D2) (Vitamin D2) 50,000 UNIT CAPSULE   1 CAP PO Q30D 
SUPPLEMENT  (Reported)
Fluticasone/Salmeterol (Advair 250-50 Diskus) 250 MCG-50 MCG/DOSE BLST.W.DEV   1
PUF INH BID RESP.  (Reported)
Folic Acid 1 MG TABLET   1 TAB PO DAILY SUPPLEMENT  (Reported)
Furosemide 40 MG TABLET   1 TAB PO DAILY DIURETIC  (Reported)
Metoprolol Succinate 50 MG TAB.ER.24H   1 TAB PO DAILY HEART  (Reported)
Oxybutynin Chloride (Oxybutynin Chloride ER) (Unknown Strength) TAB.ER.24   (
Unknown Dose) PO DAILY UNKNOWN  (Reported)
Sennosides (Senna) 8.6 MG TABLET   2 TAB PO QHS GI  (Reported)
Sertraline HCl 50 MG TABLET   1 TAB PO DAILY DEPRESSION  (Reported)
Simvastatin (Simvastatin*) 40 MG TABLET   1 TAB PO QPM CHOLESTEROL  (Reported)
Tolterodine Tartrate (Detrol LA) 4 MG CAP.ER.24H   1 CAP PO DAILY BLADDER  (
Reported)
Triamcinolone Acetonide 0.1 % OINT...G.   1 TATO TOP AD SKIN  (Reported)
 
Current Medications:
 Current Medications
 
 
  Sig/Angela Start time  Last
 
Medication Dose Route Stop Time Status Admin
 
Acetaminophen 650 MG Q6P PRN 04/15 1715 AC 
 
  PO   
 
Albuterol Sulfate 2 PUF Q4-6 PRN PRN 04/15 1830 AC 
 
  INH   
 
Apixaban 5 MG BID 04/16 0900 AC 04/16
 
  PO   2036
 
Aspirin Buffered 81 MG DAILY 04/16 1108 AC 04/16
 
  PO   1334
 
Atorvastatin Calcium 20 MG 1700 04/16 1700 AC 04/16
 
  PO   1646
 
Budesonide/ 2 PUF BID 04/15 2100 AC 04/16
 
Formoterol Fumarate  INH   2036
 
Docusate Sodium 100 MG DAILY 04/16 0900 AC 04/16
 
  PO   0909
 
Hydrocodone Bitart/ 1 TAB Q8P PRN 04/15 1730 AC 04/16
 
Acetaminophen  PO   0515
 
Metoprolol Succinate 50 MG DAILY 04/17 0900 AC 
 
  PO   
 
Oxybutynin Chloride 5 MG TID 04/17 0900 AC 
 
  PO   
 
Sertraline HCl 50 MG DAILY 04/15 1817 AC 04/16
 
  PO   0909
 
Sodium Chloride 1,000 ML Q13H 04/16 1230 DC 04/16
 
  IV 04/17 0129  1335
 
Sodium Chloride 1,000 ML Q13H 04/15 2115 DC 04/15
 
  IV 04/16 1014  2352
 
 
 
 
Past History
 
Medical History
Blood Transfusion Hx: No
Neurological: NONE
EENT: PSORIASIS
Cardiovascular: AFIB, CAD, mitral stenosis
Respiratory: asthma
Gastrointestinal: NONE
Hepatic: NONE
Renal: NONE
Musculoskeletal: gout, OSTEOARTHRITIS
Psychiatric: NONE
Endocrine: NONE
Blood Disorders: NONE
Cancer(s): NONE
GYN/Reproductive: NONE
Other Medical Hx:
Psoriasis
 
Surgical History
Pertinent Surgical History: CABG
 
Family History
Relations & Conditions If Any:
SISTER
  FH: diabetes mellitus
 
 
Psychosocial History
Where Do You Live? Home
Who Do You Live With? self
Services at Home: Home Health Aide, Nursing
Primary Language: English
Smoking Status: Never Smoked
ETOH Use: former heavy use
Illicit Drug Use: denies illicit drug use
 
Functional Ability
Ambulation: walker
 
Review of Systems
Review of Systems:
Significant for bilateral lower extremity discomfort.
 
Exam & Diagnostic Data
Vital Signs and I&O
Vital Signs
 
 
Date Time Temp Pulse Resp B/P B/P Pulse O2 O2 Flow FiO2
 
     Mean Ox Delivery Rate 
 
04/17 0000      96 Room Air  
 
04/17 0000 99.7 76 18 110/00  96 Room Air  
 
04/16 2120      95 Room Air Room Air 
 
04/16 1600      96 Room Air  
 
04/16 1600 98.7 85 18 116/68  96 Room Air  
 
04/16 1435       Room Air  
 
 
 Intake & Output
 
 
 04/17 1600 04/17 0800 04/17 0000 04/16 1600 04/16 0800 04/16 0000
 
Intake Total  708 1180 837 456 500
 
Output Total  200 250 475  200
 
Balance  508 930 362 456 300
 
       
 
Intake, IV  468 460 537 216 500
 
Intake, Oral  240 720 300 240 
 
Number  0  1  
 
Bowel      
 
Movements      
 
Output, Stool   0   
 
Output, Urine  200 250 475  200
 
Patient  288 lb   274 lb 
 
Weight      
 
Weight  Bed scale   Bed scale 
 
Measurement      
 
Method      
 
 
 
Physical Exam:
Bilateral lower extremities are perfused and warm.  He does have pain on 
palpation.  There is significant edema.  There is no evidence of ischemia.  It 
does appear to be weakness of both lower extremities.
Last 24 Hours of Labs:
 Laboratory Tests
 
 
 04/17 04/16
 
 0450 1342
 
Chemistry  
 
  Sodium (137 - 145 mmol/L) 138 
 
  Potassium (3.5 - 5.1 mmol/L) 3.9 
 
  Chloride (98 - 107 mmol/L) 103 
 
  Carbon Dioxide (22 - 30 mmol/L) 25 
 
  Anion Gap (5 - 16) 10 
 
  BUN (9 - 20 mg/dL) 22  H 
 
  Creatinine (0.7 - 1.2 mg/dL) 0.7 
 
  Estimated GFR (>60 ml/min) > 60 
 
  BUN/Creatinine Ratio (7 - 25 %) 31.4  H 
 
Hematology  
 
  CBC w Diff NO MAN DIFF REQ NO MAN DIFF REQ
 
  WBC (4.8 - 10.8 /CUMM) 5.4 6.3
 
  RBC (4.70 - 6.10 /CUMM) 3.53  L 3.93  L
 
  Hgb (14.0 - 18.0 G/DL) 10.0  L 11.4  L
 
  Hct (42 - 52 %) 31.3  L 34.8  L
 
  MCV (80.0 - 94.0 FL) 88.7 88.5
 
  MCH (27.0 - 31.0 PG) 28.4 28.9
 
  MCHC (33.0 - 37.0 G/DL) 32.0  L 32.6  L
 
  RDW (11.5 - 14.5 %) 14.5 15.4  H
 
  Plt Count (130 - 400 /CUMM) 121  L 128  L
 
  MPV (7.4 - 10.4 FL) 8.7 8.9
 
  Gran % (42.2 - 75.2 %) 65.4 68.6
 
  Lymphocytes % (20.5 - 51.1 %) 20.0  L 15.4  L
 
  Monocytes % (1.7 - 9.3 %) 13.7  H 15.2  H
 
  Eosinophils % (0 - 5 %) 0.8 0.6
 
  Basophils % (0.0 - 2.0 %) 0.1 0.2
 
  Absolute Granulocytes (1.4 - 6.5 /CUMM) 3.6 4.3
 
  Absolute Lymphocytes (1.2 - 3.4 /CUMM) 1.1  L 1.0  L
 
  Absolute Monocytes (0.10 - 0.60 /CUMM) 0.7  H 0.9  H
 
  Absolute Eosinophils (0.0 - 0.7 /CUMM) 0 0
 
  Absolute Basophils (0.0 - 0.2 /CUMM) 0 0
 
 
 
 
Assessment/Plan
Assessment/Plan
85-year-old male with a history of syncope and a carotid occlusion.
 
1.)  It appears from the notes that the family does not want any further 
escalation of care-in the setting- Addiditionaly in the setting of a carotid 
occlusion no surgical intervention is warranted
2.)  Would recommend neurology/neurosurgery evaluation for leg weakness
3.)  Recommend antiplatelet/anticoagulation as per neurology/medical evaluation
4.)  Continue care as per primary team.
5.)  May follow up as outpatient for surveillance of the left ICA.
 
 
Consult Acknowledgment
- Thank you for your consult request.

## 2018-04-17 NOTE — PN- HOUSESTAFF
Chavo Winkler 18 0738:
Subjective
Follow-up For:
Syncope
Generalized weakness
Lactic acidosis resolved
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
Bilateral knee effusion, bilateral knee pain, knee arthritis
Complaints: pain scale (0-10)
Tele-Events Since Last Visit:
Tele events uneventful
Atrial fibrillation, rate varying between 80-90, 
No pauses
No rony cardia
Subjective:
Patient was seen and examined this morning.  He is alert awake and oriented to 
time place and person.  No acute events noticed.
 
Patient denies any further episodes of syncope, dizzy or lightheadedness.  
Denies loss of consciousness.  
No chest pain, short of breath, fever, chills, palpitations.
 
Patient reports bilateral knee pain, 10 out of 10.  Knee x-ray was reviewed, 
showing bilateral knee effusion and soft tissue swelling.  Not allowing us to 
touch his knee.  He couldn't raise his leg above the bed because of severe pain.
 
Telemetry events noticed- uneventful
Vitals afebrile, heart rate 80, respiratory 16, blood pressure 120/87, 
saturating at 97 on room air
 
 
Review of Systems
Constitutional:
Reports: see HPI. 
 
Objective
Last 24 Hrs of Vital Signs/I&O
 Vital Signs
 
 
Date Time Temp Pulse Resp B/P B/P Pulse O2 O2 Flow FiO2
 
     Mean Ox Delivery Rate 
 
 0932 98.7 64 24 110/90     
 
 0700 98.7 78 24 110/90  95 Room Air  
 
 0000      96 Room Air  
 
 0000 99.7 76 18 110/00  96 Room Air  
 
 2120      95 Room Air Room Air 
 
 1600      96 Room Air  
 
 1600 98.7 85 18 116/68  96 Room Air  
 
 1435       Room Air  
 
 
 Intake & Output
 
 
  1600  0800  0000
 
Intake Total  708 1180
 
Output Total  200 250
 
Balance  508 930
 
    
 
Intake, IV  468 460
 
Intake, Oral  240 720
 
Number  0 
 
Bowel   
 
Movements   
 
Output, Stool   0
 
Output, Urine  200 250
 
Patient  130.748 kg 
 
Weight   
 
Weight  Bed scale 
 
Measurement   
 
Method   
 
 
 
 
Physical Exam
General Appearance: Alert, Oriented X3, Cooperative, No Acute Distress
Other Physical Findings:
Cardiovascular distant heart sounds, irregularly irregular rhythm, systolic 2/6 
murmur
Lungs Clear to Auscultation, Normal Air Movement
Abdomen Normal Bowel Sounds, Soft, No Tenderness
Neurological Normal Speech, Sensation Intact, patient was not allowing to do 
lower extremity neuro exam secondary to knee pain.  Very tender to touch.  
Warmth over b/l knees and bilateral lower extremities.
Extremities No Clubbing, No Cyanosis, 1+ BLE edema
Current Medications:
 Current Medications
 
 
  Sig/Angela Start time  Last
 
Medication Dose Route Stop Time Status Admin
 
Acetaminophen 650 MG Q6P PRN 04/15 1715 AC 
 
  PO   
 
Albuterol Sulfate 2 PUF Q4-6 PRN PRN 04/15 1830 AC 
 
  INH   
 
Apixaban 5 MG BID  0900 AC 
 
  PO   0930
 
Aspirin Buffered 81 MG DAILY  1108 AC 
 
  PO   0930
 
Atorvastatin Calcium 20 MG 1700  1700 AC 
 
  PO   1646
 
Budesonide/ 2 PUF BID 04/15 2100 AC 
 
Formoterol Fumarate  INH   0931
 
Docusate Sodium 100 MG DAILY  0900 AC 
 
  PO   0942
 
Hydrocodone Bitart/ 1 TAB Q8P PRN 04/15 1730 AC 
 
Acetaminophen  PO   0515
 
Metoprolol Succinate 50 MG DAILY  0900 AC 
 
  PO   0932
 
Oxybutynin Chloride 5 MG TID  09 AC 
 
  PO   0929
 
Sertraline HCl 50 MG DAILY 04/15 1817 AC 
 
  PO   0932
 
Sodium Chloride 1,000 ML Q13H  1230 DC 
 
  IV  0129  1335
 
 
 
 
Last 24 Hrs of Lab/Dusty Results
Last 24 Hrs of Labs/Mics:
 Laboratory Tests
 
18 0450:
Anion Gap 10, Estimated GFR > 60, BUN/Creatinine Ratio 31.4  H, CBC w Diff NO 
MAN DIFF REQ, RBC 3.53  L, MCV 88.7, MCH 28.4, MCHC 32.0  L, RDW 14.5, MPV 8.7, 
Gran % 65.4, Lymphocytes % 20.0  L, Monocytes % 13.7  H, Eosinophils % 0.8, 
Basophils % 0.1, Absolute Granulocytes 3.6, Absolute Lymphocytes 1.1  L, 
Absolute Monocytes 0.7  H, Absolute Eosinophils 0, Absolute Basophils 0
 
 
Assessment/Plan
Assessment:
Patient is a 85-year-old male with an extensive past medical history significant
for A. fib on eliqus, heart failure with reduced ejection fraction, mitral 
stenosis, prosthetic aortic valve, CAD status post CABG and aortic valve 
replacement, COPD/asthma, gout, osteoarthritis, psoriasis, HTN, HLD, OA, who 
presents after suffering an unwitnessed fall while at home. 
 
 
 
Vitals on admission: T 97.7, P 93, RR 16, /87, pulse ox 97% on room air
Labs: CO2 31, BUN 28, glucose 118, lactic acid 2.2, T bili 1.4, creatinine 
kinase 213, TSH and free T4 normal, troponin 0.04, dirty UA with mucus, ketones,
bacteria, epithelial cells
 
echo 2016
No  obvious regional wall motion abnormalities. Normal left ventricular  
ejection fraction estimated at 55-60%. Left ventricular wall  thickness 
increased. 
 
Lower extremity venous Doppler negative for DVT
Arterial ultrasound lower extremity no peripheral arterial disease
Head CT and cervical spine CT no acute abnormality was found
Knee x-ray- Bilateral knee arthritis most notable in the medial compartments, 
left worse
than right. No acute fracture or dislocation is seen. There are bilateral
joint effusions and soft tissue swelling.
Chest CT -  Incidental note is made of a tiny 2 to 3 mm linear opacity within 
the left
upper lobe of the lung, may represent atelectatic changes versus tiny nodule.
carotid doppler- 1. RIGHT: There is complete occlusion of the right internal 
carotid artery.
 2. LEFT: Minimal, nonhemodynamically significant stenosis of the proximalleft 
internal carotid artery corresponding to a 0-49% stenosis by velocity criteria. 
3. No evidence for hemodynamically significant stenosis in the external
carotid arteries.
 
--------------------------------------------------------------------------------
-------------
 
 
Unwitnessed fall/questionable syncope
patient presents after suffering an unwitnessed fall while at home.  Patient is 
a poor historian and much of the history was obtained from his niece who 
participates in his care.  Patient suffered a mechanical fall last week which 
was witnessed at an adult  center.  He did not suffer any injuries that 
time.  He was in his usual state of health until prior to admission he fell in 
his bathroom, he was able to crawl to bed however he continued to feel weak.  He
was found by his home health aide called [phone].  Patient states that he slipped 
and fell and that he lost consciousness He endorses lightheadedness preceding 
the fall.  He denied any associated chest pain, shortness breath, vomiting, 
diaphoresis.
 
* Tele hold in ICU
* Continuous telemetry monitoring
* Monitor vitals every shift
* Serial troponin and EKG negative
* EKG showed atrial fibrillation, rate under control no acute ST-T wave changes
* No arrhythmias were found other than his atrial fibrillation
* orthostatic vitals- didnt allow to do
* Ruled out vasovagal syncope
* No hypoglycemic episodes
* No history of seizures
* gentle IV hydration 1 bag NS
* Physical therapy on board-recommended short-term rehabilitation
* Cardiologist on board.
* Patient has history of bioprosthetic aortic valve.  Echocardiogram is pending.
 We'll evaluate for severity of aortic stenosis
 
 
 
Generalized weakness
Patient has generalized weakness.
Physical therapy on board
Recommended short-term rehabilitation
B12 and thyroid numbers were normal 
Of note patient is getting vitamin D 50,000 international unit capsule every 
month and vitamin B12 injection every month
 
 
 
Lactic acidosis
Lactic acid 2.2 at the time of admission
Resolved with gentle hydration
 
 
 
 
Bilateral knee pain
Patient reports 10 out of 10 bilateral knee pain, tender to touch.  Bilateral 
knee x-ray was done which showed Bilateral knee arthritis most notable in the 
medial compartments, left worse
than right. No acute fracture or dislocation is seen. There are bilateral joint 
effusions and soft tissue swelling.
* He has no fever, leukocytosis.  However given his history of psoriasis, being 
on adalimumab we cannot really rely on his fever or leukocytosis trend.  Uric 
acid was 6.1.  Less likely gout.
* Most possibly bilateral knee osteoarthritis, end-stage, flare up from recent 
fall s/p crawling on the floor with his knees.
* Orthopedic consult was recommended to rule out any septic joint.
* We will continue Tylenol, Vicodin for pain management as needed
 
 
 
 
Right carotid artery complete occlusion
carotid doppler- 1. RIGHT: There is complete occlusion of the right internal 
carotid artery.
 2. LEFT: Minimal, nonhemodynamically significant stenosis of the proximalleft 
internal carotid artery corresponding to a 0-49% stenosis by velocity criteria. 
3. No evidence for hemodynamically significant stenosis in the external carotid 
arteries.
 
* Vascular surgery on board given his history of syncope and carotid occlusion
* Family doesn't want any further escalation of care.  Additionally in the 
setting of carotid occlusion no surgical intervention is recommended.
* Recommended outpatient follow-up for surveillance of left internal carotid 
artery
* Will continue baby aspirin
 
 
 
Bruise back of left thigh
Patient has Bruise on the back of his left thigh status post fall.  The borders 
were marked and has remained stable.  We'll continue to monitor H&H.  Will get 
CAT scan of by if hemoglobin drops to look for any hematoma
 
 
History of coronary artery disease continue baby aspirin
Hyperlipidemia continue simvastatin
History of atrial fibrillation continue eliqus 5 twice daily and metoprolol 
succinate 50 daily
Hypertension continue metoprolol succinate with holding parameters
Chronic heart failure with reduced ejection fraction Hold Lasix for now given 
syncopal episode
Psoriasis gets adalimumab 40 mg injection every 2 weeks
Asthma continue TRC nebs
Overactive bladder continue oxybutynin
Depression continue Zoloft
 
 
 
Bedside swallow eval was passed
Discussion was had over the phone with patient's niece Phuong who is able to 
talk directly to the patient's sister/POA.  As of this time that it was decided 
to be DNR/DNI and they also expressed a wish not to escalate care if the need 
should arise, with no ICU transfer or central line or pressors.
 
 
Diet: Heart healthy diet
DVT prophylaxis: Eliquis, Alps
CODE STATUS: DNR/DNI
Problem List:
 1. Weakness
 
 2. Syncope and collapse
 
 3. Dehydration
 
Pain Ratin
Pain Location:
b/l knee
Pain Goal: Remain pain free
Pain Plan:
tylenol
Tomorrow's Labs & Rationales:
cbc
bep
 
 
Margy LORENZO,Sejal 18 1523:
Attending MD Review Statement
 
Attending Statement
Attending MD Statement: examined this patient, discuss w/resident/PA/NP, agreed 
w/resident/PA/NP, reviewed EMR data (avail), discussed with nursing, reviewed 
images
Attending Assessment/Plan:
85-year-old male past medical history of A. fib on Eliquis, bioprosthetic aortic
valve for aortic stenosis who is here with an unwitnessed fall and possible 
syncope.  He has complete right carotid occlusion and was evaluated by surgery 
and right now no intervention is planned.  My worry is that he's complaining of 
this lower extremity pain and knee pain.  He appears to have more on the right 
side than on the left side although he has edema of both his lower extremities. 
He doesn't have a fever and doesn't have a white count, I don't think this is 
acute cellulitis but given the knee effusion ,the Eliquis and the pain ,I think 
he does deserve an orthopedic evaluation.  If orthopedics is unrevealing then we
'll have to consider other etiologies for this lower extremity pain.

## 2018-04-17 NOTE — ECHOCARDIOGRAM REPORT
CHRISTELLE CRUZ 
 
 Age:    85     :    1933      Gender:     M 
 
 MRN:    [id number] 
 
 Exam Date:     2018  
                11:50 
 
 Exam Location: [facility] 
 
 Ht (in):     71      Wt (lb):      274     BSA:    2.54 
 
 BP:          142     /     76 
 
 Ordering Physician:        Alberto Morales MD 
 
 Referring Physician:       Alberto Morales MD 
 
 Technologist:              Jermaine Kelsey [facility] 
 
 Room Number:               108-1 
 
 Indications:       PRESYNCOPE/SYNCOPE 
 
 Rhythm:                 Atrial fibrillation 
 
 Technical Quality:      technically difficult 
 
 FINDINGS 
 
 Left Ventricle 
 LVH, no regional wall motion abnormalities. apex not well  
 visualized. 
 EF estimated 55%. 
 
 Right Ventricle 
 normal size and function. 
 
 Right Atrium 
 normal dimension. 
 
 Left Atrium 
 At least moderately dilated. interatrial septum not well visualized  
 but no apparent PFO. 
 
 Mitral Valve 
 calcification of annulus and leaflets. good excursion of leaflets.  
 minimal gradient across mitral valve. 
 trace MR. 
 
 Aortic Valve 
 Sclerosis without significant stenosis, no significant transaortic  
 gradient. No leak visualized with limitation of poor view. 
 
 Tricuspid Valve 
 normal morphologhy, no TR appreciated. 
 
 Pulmonic Valve 
 Normal morphology, no HI appreciated. 
 
 Pericardium 
 no pericardial effusion. 
 
 Great Vessels 
 visualized segments appear normal. 
 
 CONCLUSIONS 
 LVH, no regional wall motion abnormalities. 
 [place] not well visualized. 
 
 EF estimated 55%. 
 Normal size and function of right ventricle. 
 At least moderately dilated left atrium. 
 severe calcification of mitral annulus and leaflets. 
 Good excursion of mitral leaflets. 
 Minimal gradient across mitral valve. 
 
 trace MR. 
 aortic valve sclerosis without significant stenosis, no significant  
 transaortic gradient. 
 
 Kelsy Armas M.D. 
 (Electronically Signed) 
 Final Date:      2018  
                  20:31 
 
 MEASUREMENTS  (Male / Female) Normal Values 
 
 
 DOPPLER 
 AV Peak Velocity                  141.0 cm/s             
 AV Peak Gradient                  8.0 mmHg               
 LVOT Peak Velocity                116.0 cm/s             
 LVOT Peak Gradient                5.4 mmHg               
 MV Peak Velocity                  165.0 cm/s             
 MV Peak Gradient                  10.9 mmHg              
 MV Mean Velocity                  88.3 cm/s              
 MV Mean Gradient                  4.0 mmHg

## 2018-04-17 NOTE — PN- CARDIOLOGY
Subjective
Subjective:
No acute events. No complaints today. Monitor shows afib with good rate control 
, no episodes of sustained bradycardia or tahcycardia.
 
Objective
Vital Signs and I&Os
Vital Signs
 
 
Date Time Temp Pulse Resp B/P B/P Pulse O2 O2 Flow FiO2
 
     Mean Ox Delivery Rate 
 
04/17 0932 98.7 64 24 110/90     
 
04/17 0700 98.7 78 24 110/90  95 Room Air  
 
04/17 0000      96 Room Air  
 
04/17 0000 99.7 76 18 110/00  96 Room Air  
 
04/16 2120      95 Room Air Room Air 
 
04/16 1600      96 Room Air  
 
04/16 1600 98.7 85 18 116/68  96 Room Air  
 
04/16 1435       Room Air  
 
 
 Intake & Output
 
 
 04/17 1600 04/17 0800 04/17 0000 04/16 1600 04/16 0800 04/16 0000
 
Intake Total  708 1180 837 456 500
 
Output Total  200 250 475  200
 
Balance  508 930 362 456 300
 
       
 
Intake, IV  468 460 537 216 500
 
Intake, Oral  240 720 300 240 
 
Number  0  1  
 
Bowel      
 
Movements      
 
Output, Stool   0   
 
Output, Urine  200 250 475  200
 
Patient  288 lb   274 lb 
 
Weight      
 
Weight  Bed scale   Bed scale 
 
Measurement      
 
Method      
 
 
 
 
Physical Exam
General Appearance: well developed/nourished, no apparent distress, alert, 
comfortable
Neck: supple, trachea mid line (no JVD)
Respiratory: normal breath sounds, lungs clear
Cardiovascular: normal peripheral pulses, systolic murmur, irregularly irregular
Abdomen: normal bowel sounds, soft, non-tender, no organomegaly
Extremities: normal capillary refill, no edema
 
Assessment/Plan
Assessment/Plan
Fall with possible loss of consciousness preceding...
History of stenotic aortic valve bioprosthesis.
dehydration resolved.
 
echocardiogram to evaluate severity of aortic stenosis.
maintain on telemetry. 
Continue telemetry? Yes

## 2018-04-18 VITALS — DIASTOLIC BLOOD PRESSURE: 62 MMHG | SYSTOLIC BLOOD PRESSURE: 110 MMHG

## 2018-04-18 VITALS — DIASTOLIC BLOOD PRESSURE: 60 MMHG | SYSTOLIC BLOOD PRESSURE: 102 MMHG

## 2018-04-18 VITALS — DIASTOLIC BLOOD PRESSURE: 64 MMHG | SYSTOLIC BLOOD PRESSURE: 166 MMHG

## 2018-04-18 VITALS — SYSTOLIC BLOOD PRESSURE: 122 MMHG | DIASTOLIC BLOOD PRESSURE: 60 MMHG

## 2018-04-18 VITALS — DIASTOLIC BLOOD PRESSURE: 60 MMHG | SYSTOLIC BLOOD PRESSURE: 110 MMHG

## 2018-04-18 LAB
ABSOLUTE GRANULOCYTE CT: 3.1 /CUMM (ref 1.4–6.5)
BASOPHILS # BLD: 0 /CUMM (ref 0–0.2)
BASOPHILS NFR BLD: 0.5 % (ref 0–2)
EOSINOPHIL # BLD: 0.1 /CUMM (ref 0–0.7)
EOSINOPHIL NFR BLD: 1.5 % (ref 0–5)
ERYTHROCYTE [DISTWIDTH] IN BLOOD BY AUTOMATED COUNT: 15.1 % (ref 11.5–14.5)
GRANULOCYTES NFR BLD: 61.7 % (ref 42.2–75.2)
HCT VFR BLD CALC: 31.1 % (ref 42–52)
LYMPHOCYTES # BLD: 1.2 /CUMM (ref 1.2–3.4)
MCH RBC QN AUTO: 29.2 PG (ref 27–31)
MCHC RBC AUTO-ENTMCNC: 32.7 G/DL (ref 33–37)
MCV RBC AUTO: 89.2 FL (ref 80–94)
MONOCYTES # BLD: 0.7 /CUMM (ref 0.1–0.6)
PLATELET # BLD: 134 /CUMM (ref 130–400)
PMV BLD AUTO: 9.1 FL (ref 7.4–10.4)
RED BLOOD CELL CT: 3.49 /CUMM (ref 4.7–6.1)
WBC # BLD AUTO: 5 /CUMM (ref 4.8–10.8)

## 2018-04-18 PROCEDURE — 0S9C3ZZ DRAINAGE OF RIGHT KNEE JOINT, PERCUTANEOUS APPROACH: ICD-10-PCS | Performed by: PHYSICIAN ASSISTANT

## 2018-04-18 PROCEDURE — 0S9D3ZX DRAINAGE OF LEFT KNEE JOINT, PERCUTANEOUS APPROACH, DIAGNOSTIC: ICD-10-PCS | Performed by: PHYSICIAN ASSISTANT

## 2018-04-18 NOTE — PN- CARDIOLOGY
Subjective
Subjective:
Sanguinous effusion aspirated from right knee, and serosanguinous aspirate from 
left knee.
Given the patient's frailty, neurology belves risks of continuing 
anticoagulation for atrial fibrillation outweight benefits, and i completely 
agree.
 
Objective
Vital Signs and I&Os
Vital Signs
 
 
Date Time Temp Pulse Resp B/P B/P Pulse O2 O2 Flow FiO2
 
     Mean Ox Delivery Rate 
 
04/18 2235 99.0 76 20 102/60  96 Room Air  
 
04/18 1527 98.0 95 20 110/60  98 Room Air  
 
04/18 1423       Room Air  
 
04/18 1357  82  116/70     
 
04/18 0554 98.2 74 20 110/62  97 Room Air  
 
04/18 0133 98.3 76 20 122/60  97 Room Air  
 
04/18 0000      94 Room Air  
 
04/18 0000 98.7 70 18 166/64  94 Room Air  
 
 
 Intake & Output
 
 
 04/18 1600 04/18 0800 04/18 0000 04/17 1600 04/17 0800 04/17 0000
 
Intake Total 480  708 1180
 
Output Total 700 300 450 450 200 250
 
Balance -220 -190 -210 900 508 930
 
       
 
Intake, IV  10  750 468 460
 
Intake, Oral 480 100 240 600 240 720
 
Number     0 
 
Bowel      
 
Movements      
 
Output, Stool      0
 
Output, Urine 700 300 450 450 200 250
 
Patient  273 lb   288 lb 
 
Weight      
 
Weight     Bed scale 
 
Measurement      
 
Method      
 
 
 
Physical Exam:
General Appearance: Alert, Oriented X3, Cooperative, No Acute Distress
HEENT:MI, Mucous Membr. moist/pink
Neck: Supple, trachea midline, no JVD
Cardiovascular: Irregularly irregular, systolic ejection murmur 3/6 left sternal
border, radiating to carotids.
Lungs: Clear to Auscultation, Normal Air Movement
Abdomen: Normal Bowel Sounds, Soft, No Tenderness.
Extremities: b/l LE edema 3+
Vascular: Pulses Symmetrical
Current Medications:
 Current Medications
 
 
  Sig/Angela Start time  Last
 
Medication Dose Route Stop Time Status Admin
 
Acetaminophen 650 MG Q6P PRN 04/15 1715 AC 
 
  PO   
 
Albuterol Sulfate 2 PUF Q4-6 PRN PRN 04/15 1830 AC 
 
  INH   
 
Apixaban 5 MG BID 04/16 0900 DC 04/17
 
  PO   2127
 
Aspirin Buffered 81 MG DAILY 04/16 1108 AC 04/18
 
  PO   1037
 
Atorvastatin Calcium 20 MG 1700 04/16 1700 AC 04/18
 
  PO   1722
 
Budesonide/ 2 PUF BID 04/15 2100 AC 04/18
 
Formoterol Fumarate  INH   2118
 
Docusate Sodium 100 MG DAILY 04/16 0900 AC 04/18
 
  PO   1037
 
Hydrocodone Bitart/ 1 TAB Q8P PRN 04/15 1730 AC 04/18
 
Acetaminophen  PO   1036
 
Metoprolol Succinate 50 MG DAILY 04/17 0900 AC 04/18
 
  PO   1357
 
Oxybutynin Chloride 5 MG TID 04/17 0900 AC 04/18
 
  PO   2117
 
Sertraline HCl 50 MG DAILY 04/15 1817 AC 04/18
 
  PO   1037
 
 
 
 
Results
Last 48 Hrs of Labs/Mics:
 Laboratory Tests
 
04/18/18 0915:
Lymphocytes 4, % Normal PMNs 86, Misc Hematology Test 9, Fluid WBC 6336  H, Fld 
Total RBCs Counted 5035486  H
 
04/18/18 0915:
Lymphocytes 1, % Normal PMNs 92, Misc Hematology Test 7, Fluid WBC 3608  H, Fld 
Total RBCs Counted 58031  H, Sodium Urate Crystals RARE
 
04/18/18 0706:
Anion Gap 7, Estimated GFR > 60, BUN/Creatinine Ratio 23.8, CBC w Diff NO MAN 
DIFF REQ, RBC 3.49  L, MCV 89.2, MCH 29.2, MCHC 32.7  L, RDW 15.1  H, MPV 9.1, 
Gran % 61.7, Lymphocytes % 23.0, Monocytes % 13.3  H, Eosinophils % 1.5, 
Basophils % 0.5, Absolute Granulocytes 3.1, Absolute Lymphocytes 1.2, Absolute 
Monocytes 0.7  H, Absolute Eosinophils 0.1, Absolute Basophils 0
 
04/17/18 0450:
Anion Gap 10, Estimated GFR > 60, BUN/Creatinine Ratio 31.4  H, CBC w Diff NO 
MAN DIFF REQ, RBC 3.53  L, MCV 88.7, MCH 28.4, MCHC 32.0  L, RDW 14.5, MPV 8.7, 
Gran % 65.4, Lymphocytes % 20.0  L, Monocytes % 13.7  H, Eosinophils % 0.8, 
Basophils % 0.1, Absolute Granulocytes 3.6, Absolute Lymphocytes 1.1  L, 
Absolute Monocytes 0.7  H, Absolute Eosinophils 0, Absolute Basophils 0
 
 
Assessment/Plan
Assessment/Plan
elderly gentleman with moderate intellectual disability, atrial fibrillation on 
eliquis, s/p bioprosthetic aortic valve replacement, who sustained a fall from 
his height with unclear circumstances.
He has hemarthrosis in the right knee and possibly left knee as well, which 
likely are a result of his fall while anticoagulated.
In addition, mr Gonzalez has signs of right heart failure and venous insuffiency
, following IV rehydration. 
 
I am in agreement with Dr Hugo regarding permanently discontinuing this 
patient's anticoagulation given his overall frailty and fall risk.
I would maintain him on aspirin alone.
 
He would benefit from diuretics for his edema, perhaps 20 mg PO bid, in the 
setting of his recent dehydration status.
Continue telemetry? Yes

## 2018-04-18 NOTE — CONS- ORTHOPEDIC
General Information and HPI
 
Consulting Request
Date of Consult: 04/18/18
Requested By:
Elvis Carrasco MD
 
History of Present Illness:
85 yr old male with bilateral knee pain and effusions s/p fall d/t syncopal 
episode. patient is poor historian. x-rays of bilateral knees show extensive djd
/oa. no fracture no dislocation. states has extreme pain with motion. patient is
on eliquis. 
 
Allergies/Medications
Allergies:
Coded Allergies:
No Known Allergies (11/16/17)
 
Home Med List:
Adalimumab (Humira Pen) 40 MG/0.8 ML PEN.IJ.KIT   40 MG SC Q2W PSORIASIS  (
Reported)
Albuterol Sulfate (Ventolin Hfa) 18 GM HFA.AER.AD   2 PUF INH Q4-6 PRN PRN 
ASTHMA  (Reported)
Albuterol Sulfate 2.5 MG/3 ML (0.083 %) VIAL.NEB   1 Vial INH/SOL TID PRN RESP. 
(Reported)
Apixaban (Eliquis) 5 MG TABLET   5 MG PO Q12H AF
Aspirin (Ecotrin*) 81 MG TABLET.DR   1 TAB PO DAILY HEART/BLOOD  (Reported)
Clotrimazole 1 % CREAM..G.   1 TATO TOP QAMPM rash
     apply to affected area(s)
Cyanocobalamin (Vitamin B-12) (Cyanocobalamin Injection) 1,000 MCG/ML VIAL   1 
ML IM Q30D SUPPLEMENT  (Reported)
Docusate Sodium (Colace) 100 MG CAPSULE   1 CAP PO DAILY STOOL SOFTENER  (
Reported)
Ergocalciferol (Vitamin D2) (Vitamin D2) 50,000 UNIT CAPSULE   1 CAP PO Q30D 
SUPPLEMENT  (Reported)
Fluticasone/Salmeterol (Advair 250-50 Diskus) 250 MCG-50 MCG/DOSE BLST.W.DEV   1
PUF INH BID RESP.  (Reported)
Folic Acid 1 MG TABLET   1 TAB PO DAILY SUPPLEMENT  (Reported)
Furosemide 40 MG TABLET   1 TAB PO DAILY DIURETIC  (Reported)
Metoprolol Succinate 50 MG TAB.ER.24H   1 TAB PO DAILY HEART  (Reported)
Oxybutynin Chloride (Oxybutynin Chloride ER) (Unknown Strength) TAB.ER.24   (
Unknown Dose) PO DAILY UNKNOWN  (Reported)
Sennosides (Senna) 8.6 MG TABLET   2 TAB PO QHS GI  (Reported)
Sertraline HCl 50 MG TABLET   1 TAB PO DAILY DEPRESSION  (Reported)
Simvastatin (Simvastatin*) 40 MG TABLET   1 TAB PO QPM CHOLESTEROL  (Reported)
Tolterodine Tartrate (Detrol LA) 4 MG CAP.ER.24H   1 CAP PO DAILY BLADDER  (
Reported)
Triamcinolone Acetonide 0.1 % OINT...G.   1 TATO TOP AD SKIN  (Reported)
 
 
Past History
 
Medical History
Blood Transfusion Hx: No
Neurological: NONE
EENT: PSORIASIS
Cardiovascular: AFIB, CAD, mitral stenosis
Respiratory: asthma
Gastrointestinal: NONE
Hepatic: NONE
Renal: NONE
Musculoskeletal: gout, OSTEOARTHRITIS
Psychiatric: NONE
Endocrine: NONE
Blood Disorders: NONE
Cancer(s): NONE
GYN/Reproductive: NONE
Other Medical Hx:
Psoriasis
 
Surgical History
Pertinent Surgical History: CABG
 
Family History
Relations & Conditions If Any:
SISTER
  FH: diabetes mellitus
 
 
Psychosocial History
Where Do You Live? Home
Who Do You Live With? self
Services at Home: Home Health Aide, Nursing
Primary Language: English
Smoking Status: Never Smoked
ETOH Use: former heavy use
Illicit Drug Use: denies illicit drug use
 
Functional Ability
Ambulation: walker
 
Review of Systems
Review of Systems:
see chart
 
Exam & Diagnostic Data
Vital Signs and I&O
Vital Signs
 
 
Date Time Temp Pulse Resp B/P B/P Pulse O2 O2 Flow FiO2
 
     Mean Ox Delivery Rate 
 
04/18 0554 98.2 74 20 110/62  97 Room Air  
 
04/18 0133 98.3 76 20 122/60  97 Room Air  
 
04/18 0000      94 Room Air  
 
04/18 0000 98.7 70 18 166/64  94 Room Air  
 
04/17 1600 100.1 80 19 120/80  93 Room Air  
 
 
 Intake & Output
 
 
 04/18 1600 04/18 0800 04/18 0000 04/17 1600 04/17 0800 04/17 0000
 
Intake Total   708 1180
 
Output Total  300 450 450 200 250
 
Balance  -190 -210 900 508 930
 
       
 
Intake, IV  10  750 468 460
 
Intake, Oral  100 240 600 240 720
 
Number     0 
 
Bowel      
 
Movements      
 
Output, Stool      0
 
Output, Urine  300 450 450 200 250
 
Patient  273 lb   288 lb 
 
Weight      
 
Weight     Bed scale 
 
Measurement      
 
Method      
 
 
 
Physical Exam:
+2 effusion of bilateral knees. no warmth no erythema of bilateral knees. full 
extension of bilateral knees. unable to flex bilateral knees d/t pain. bilateral
knees aspirated and with 60cc of sanguinous fluid on right and 60cc of 
serosanguious fluid aspirated on left. x-rays of bilateral knees show djd/oa. no
fractures noted. 
 
Assessment/Plan
Assessment/Plan
bilateral knee effusions
bilateral knee djd/oa
-wbat with walker
-synovial fluid sent for culture, crystals and cell count
-patient has no signs of infection in bilateral knees. most likely effusions 
coming from djd/oa and trauma especially in presence of hemearthrosis and 
patient on eliquis
 
 
Consult Acknowledgment
- Thank you for your consult request.

## 2018-04-18 NOTE — PN- HOUSESTAFF
Brett Dover 04/18/18 1342:
Subjective
Follow-up For:
yncope
Generalized weakness
Lactic acidosis resolved
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
Bilateral knee effusion, bilateral knee pain, knee arthritis
Subjective:
The patient was seen and examined. He underwent bilateral knee joint 
arthrocentesis this morning which revealed sanguinous fluid.
 
Reports pain at the location of arthrocentesis, mentions that now he can move 
his left toe. 
 
Denies any headache, n/v/dizziness, lightheadedness, CP, SOB. 
 
VSS.
 
Review of Systems
Constitutional:
Denies: chills, diaphoresis, fever, malaise, weakness, unexplained weight loss. 
 
Objective
Last 24 Hrs of Vital Signs/I&O
 Vital Signs
 
 
Date Time Temp Pulse Resp B/P B/P Pulse O2 O2 Flow FiO2
 
     Mean Ox Delivery Rate 
 
04/18 1527 98.0 95 20 110/60  98 Room Air  
 
04/18 1423       Room Air  
 
04/18 1357  82  116/70     
 
04/18 0554 98.2 74 20 110/62  97 Room Air  
 
04/18 0133 98.3 76 20 122/60  97 Room Air  
 
04/18 0000      94 Room Air  
 
04/18 0000 98.7 70 18 166/64  94 Room Air  
 
 
 Intake & Output
 
 
 04/18 1600 04/18 0800 04/18 0000
 
Intake Total 480 110 240
 
Output Total 700 300 450
 
Balance -220 -190 -210
 
    
 
Intake, IV  10 
 
Intake, Oral 480 100 240
 
Output, Urine 700 300 450
 
Patient  273 lb 
 
Weight   
 
 
 
 
Physical Exam
General Appearance: Alert, Oriented X3, Cooperative, No Acute Distress
Skin: No Rashes
HEENT: Atraumatic, PERRLA, EOMI, Mucous Membr. moist/pink
Neck: Supple
Lymphatic: Cervical nl
Cardiovascular: Irregularly irregular
Lungs: Clear to Auscultation, Normal Air Movement
Abdomen: Normal Bowel Sounds, Soft, No Tenderness, No Hepatospenomegaly, No 
Masses
Neurological: Normal Speech, Normal Tone, ROM, strength intact in UE, unable to 
test LEs given severe pain 
Extremities: b/l LE edema, knees tenderness to palpation
Vascular: Pulses Symmetrical
Current Medications:
 Current Medications
 
 
  Sig/Angela Start time  Last
 
Medication Dose Route Stop Time Status Admin
 
Acetaminophen 650 MG Q6P PRN 04/15 1715 AC 
 
  PO   
 
Albuterol Sulfate 2 PUF Q4-6 PRN PRN 04/15 1830 AC 
 
  INH   
 
Apixaban 5 MG BID 04/16 0900 DC 04/17
 
  PO   2127
 
Aspirin Buffered 81 MG DAILY 04/16 1108 AC 04/18
 
  PO   1037
 
Atorvastatin Calcium 20 MG 1700 04/16 1700 AC 04/18
 
  PO   1722
 
Budesonide/ 2 PUF BID 04/15 2100 AC 04/18
 
Formoterol Fumarate  INH   1043
 
Docusate Sodium 100 MG DAILY 04/16 0900 AC 04/18
 
  PO   1037
 
Hydrocodone Bitart/ 1 TAB Q8P PRN 04/15 1730 AC 04/18
 
Acetaminophen  PO   1036
 
Metoprolol Succinate 50 MG DAILY 04/17 0900 AC 04/18
 
  PO   1357
 
Oxybutynin Chloride 5 MG TID 04/17 0900 AC 04/18
 
  PO   1357
 
Sertraline HCl 50 MG DAILY 04/15 1817 AC 04/18
 
  PO   1037
 
 
 
 
Last 24 Hrs of Lab/Dusty Results
Last 24 Hrs of Labs/Mics:
 Laboratory Tests
 
04/18/18 0915:
Lymphocytes 4, % Normal PMNs 86, Misc Hematology Test 9, Fluid WBC 6336  H, Fld 
Total RBCs Counted 9120517  H
 
04/18/18 0915:
Lymphocytes 1, % Normal PMNs 92, Misc Hematology Test 7, Fluid WBC 3608  H, Fld 
Total RBCs Counted 19018  H, Sodium Urate Crystals RARE
 
04/18/18 0706:
Anion Gap 7, Estimated GFR > 60, BUN/Creatinine Ratio 23.8, CBC w Diff NO MAN 
DIFF REQ, RBC 3.49  L, MCV 89.2, MCH 29.2, MCHC 32.7  L, RDW 15.1  H, MPV 9.1, 
Gran % 61.7, Lymphocytes % 23.0, Monocytes % 13.3  H, Eosinophils % 1.5, 
Basophils % 0.5, Absolute Granulocytes 3.1, Absolute Lymphocytes 1.2, Absolute 
Monocytes 0.7  H, Absolute Eosinophils 0.1, Absolute Basophils 0
 Microbiology
04/18 1320  BLOOD: Blood Culture - RECD
04/18 1300  BLOOD: Blood Culture - RECD
04/18 0915  BODY FLUID: Body Fluid Culture - RES
04/18 0915  BODY FLUID: Gram Stain - RES
04/18 0915  BODY FLUID: Body Fluid Culture - RES
04/18 0915  BODY FLUID: Gram Stain - RES
 
 
 
Assessment/Plan
Assessment:
This is a 85-year-old male with past medical history significant for A. fib on 
apixaban, heart failure with reduced ejection fraction, mitral stenosis, 
prosthetic aortic valve, CAD status post CABG and aortic valve replacement, COPD
/asthma, gout, osteoarthritis, psoriasis, HTN, HLD, OA, who presents after 
suffering an unwitnessed fall while at home. 
 
#Unwitnessed fall/questionable syncope:
 Patient is a poor historian and much of the history was obtained from his niece
on admission who participates in his care.  
* c/w telemtry monitor
* Serial troponin and EKG negative
* EKG showed atrial fibrillation, rate under control no acute ST-T wave changes
* Cardiologist recs appreciated. 
* Per cardio stop apixaban for 24 hours now that sanguinus fluid noted on joint 
aspiration.
* Patient has history of bioprosthetic aortic valve.  Echocardiogram is pending.
 
 
#Generalized weakness:
* c/w PT
 
#Bilateral knee pain: 
* No fever, leukocytosis.  However  history of psoriasis,  on adalimumab 
* Uric acid was 6.1.  Less likely gout.
* Most possibly bilateral knee osteoarthritis, end-stage, flare up from recent 
fall 
* Knee joint aspiration revealed bilateral sanguinous fluid.
* Orthopedic consult appreciated; follow recs
* Per cardio hold apixaban until 9pm tomorrow.
* Neuro recs to stop AC all together; will consult cardio regarding this 
tomorrow.
* c/w current pain mx regimen
 
#Right carotid artery complete occlusion
carotid doppler- 1. RIGHT: There is complete occlusion of the right internal 
carotid artery.
 2. LEFT: Minimal, nonhemodynamically significant stenosis of the proximalleft 
internal carotid artery corresponding to a 0-49% stenosis by velocity criteria. 
3. No evidence for hemodynamically significant stenosis in the external carotid 
arteries.
 
* Vascular surgery following.
* Family doesn't want any further escalation of care.  Additionally in the 
setting of carotid occlusion no surgical intervention is recommended.
* Recommended outpatient follow-up for surveillance of left internal carotid 
artery
* Will continue baby aspirin
 
#Bruise back of left thigh
Patient has Bruise on the back of his left thigh status post fall.  The borders 
were marked and has remained stable.  We'll continue to monitor H&H.  Will get 
CAT scan of by if hemoglobin drops to look for any hematoma
 
 
#History of coronary artery disease 
* Continue baby aspirin
#Hyperlipidemia 
* continue simvastatin
#History of atrial fibrillation
* c/w metoprolol succinate 50 daily
* apixaban on hold as above. 
#Hypertension:
* continue metoprolol succinate with holding parameters
#Chronic heart failure with reduced ejection fraction 
* Hold Lasix for now given syncopal episode
#Psoriasis 
* On adalimumab 40 mg injection every 2 weeks
 
#Overactive bladder:
*  continue oxybutynin
#Depression:
*  continue Zoloft
 
Problem List:
 1. Syncope and collapse
 
 2. Weakness
 
Pain Rating: 3
Pain Location:
knees
Pain Goal: Pain 4 or less
Pain Plan:
PRN ordered meds
Tomorrow's Labs & Rationales:
CBC to monitor H&H
BEP to monitor electrolytes
 
 
 
Megha Robins 04/18/18 1410:
Attending MD Review Statement
 
Attending Statement
Attending MD Statement: examined this patient, discuss w/resident/PA/NP, agreed 
w/resident/PA/NP, discussed with family, reviewed EMR data (avail), discussed 
with nursing, discussed with case mgmt, reviewed images, amended to note
Attending Assessment/Plan:
85-year-old male past medical history of A. fib on Eliquis, bioprosthetic aortic
valve for aortic stenosis who is here with an unwitnessed fall and possible 
syncope. He has complete right carotid occlusion and was evaluated by vascalar 
surgery and right now no intervention is planned.  
 
B/L Lower extremity knee pain and knee effusion 2/2 DJD/OA. Orthopedics 
consulted and s/p arthorcentesis from both knees. (bloody tap possible traumatic
or possible hemarthrosis). Provide pain control. PO vicodin. ELiquis resume if 
ok with orthopedics.
 
Cardiology consulted for possible syncope. ECHO with mild to moderate valvular 
abnormalities. Afib rate controlled on eliquis. 
 
Follow cardiology and orthopedics. PT recommend STR discharge to [place].

## 2018-04-18 NOTE — CONS- NEUROLOGY
General Information and HPI
 
Consulting Request
Date of Consult: 04/18/18
Requested By:
Elvis Carrasco MD
 
Reason for Consult:
Syncope in patient on Eliquis
Source of Information: old records
Exam Limitations: unable to give history
History of Present Illness:
Consultation received this AM on 85 year old intellectually challenged male 
admitted several days ago after a fall at home.  No specific hx of the event 
available, but he did have a mechanical fall a week earlier at an adult day care
center.  He gives no history, doesn't even recall why he is in the hospital.  
Syncope was in the differential diagnosis on the admitting H&P
 
Allergies/Medications
Allergies:
Coded Allergies:
No Known Allergies (11/16/17)
 
Home Med List:
Adalimumab (Humira Pen) 40 MG/0.8 ML PEN.IJ.KIT   40 MG SC Q2W PSORIASIS  (
Reported)
Albuterol Sulfate (Ventolin Hfa) 18 GM HFA.AER.AD   2 PUF INH Q4-6 PRN PRN 
ASTHMA  (Reported)
Albuterol Sulfate 2.5 MG/3 ML (0.083 %) VIAL.NEB   1 Vial INH/SOL TID PRN RESP. 
(Reported)
Apixaban (Eliquis) 5 MG TABLET   5 MG PO Q12H AF
Aspirin (Ecotrin*) 81 MG TABLET.DR   1 TAB PO DAILY HEART/BLOOD  (Reported)
Clotrimazole 1 % CREAM..G.   1 TATO TOP QAMPM rash
     apply to affected area(s)
Cyanocobalamin (Vitamin B-12) (Cyanocobalamin Injection) 1,000 MCG/ML VIAL   1 
ML IM Q30D SUPPLEMENT  (Reported)
Docusate Sodium (Colace) 100 MG CAPSULE   1 CAP PO DAILY STOOL SOFTENER  (
Reported)
Ergocalciferol (Vitamin D2) (Vitamin D2) 50,000 UNIT CAPSULE   1 CAP PO Q30D 
SUPPLEMENT  (Reported)
Fluticasone/Salmeterol (Advair 250-50 Diskus) 250 MCG-50 MCG/DOSE BLST.W.DEV   1
PUF INH BID RESP.  (Reported)
Folic Acid 1 MG TABLET   1 TAB PO DAILY SUPPLEMENT  (Reported)
Furosemide 40 MG TABLET   1 TAB PO DAILY DIURETIC  (Reported)
Metoprolol Succinate 50 MG TAB.ER.24H   1 TAB PO DAILY HEART  (Reported)
Oxybutynin Chloride (Oxybutynin Chloride ER) (Unknown Strength) TAB.ER.24   (
Unknown Dose) PO DAILY UNKNOWN  (Reported)
Sennosides (Senna) 8.6 MG TABLET   2 TAB PO QHS GI  (Reported)
Sertraline HCl 50 MG TABLET   1 TAB PO DAILY DEPRESSION  (Reported)
Simvastatin (Simvastatin*) 40 MG TABLET   1 TAB PO QPM CHOLESTEROL  (Reported)
Tolterodine Tartrate (Detrol LA) 4 MG CAP.ER.24H   1 CAP PO DAILY BLADDER  (
Reported)
Triamcinolone Acetonide 0.1 % OINT...G.   1 TATO TOP AD SKIN  (Reported)
 
Current Medications:
 Current Medications
 
 
  Sig/Angela Start time  Last
 
Medication Dose Route Stop Time Status Admin
 
Acetaminophen 650 MG Q6P PRN 04/15 1715 AC 
 
  PO   
 
Albuterol Sulfate 2 PUF Q4-6 PRN PRN 04/15 1830 AC 
 
  INH   
 
Apixaban 5 MG BID 04/16 0900 DC 04/17
 
  PO   2127
 
Aspirin Buffered 81 MG DAILY 04/16 1108 AC 04/18
 
  PO   1037
 
Atorvastatin Calcium 20 MG 1700 04/16 1700 AC 04/17
 
  PO   1631
 
Budesonide/ 2 PUF BID 04/15 2100 AC 04/18
 
Formoterol Fumarate  INH   1043
 
Docusate Sodium 100 MG DAILY 04/16 0900 AC 04/18
 
  PO   1037
 
Hydrocodone Bitart/ 1 TAB Q8P PRN 04/15 1730 AC 04/18
 
Acetaminophen  PO   1036
 
Metoprolol Succinate 50 MG DAILY 04/17 0900 AC 04/18
 
  PO   1357
 
Oxybutynin Chloride 5 MG TID 04/17 0900 AC 04/18
 
  PO   1357
 
Sertraline HCl 50 MG DAILY 04/15 1817 AC 04/18
 
  PO   1037
 
 
 
 
Review of Systems
Review of Systems:
Pain and swelling in BLE, denies headache, vision loss, lateralized weakness or 
numbness.  Ros limited by mental state, many responses irrelevant.
 
Past History
 
Travel History
Traveled to [place] past 21 day No
 
Medical History
Blood Transfusion Hx: No
Neurological: NONE
EENT: PSORIASIS
Cardiovascular: AFIB, CAD, mitral stenosis
Respiratory: asthma
Gastrointestinal: NONE
Hepatic: NONE
Renal: NONE
Musculoskeletal: gout, OSTEOARTHRITIS
Psychiatric: NONE
Endocrine: NONE
Blood Disorders: NONE
Cancer(s): NONE
GYN/Reproductive: NONE
Other Medical Hx:
Psoriasis
 
Surgical History
Surgical History: CABG
 
Family History
Relations & Conditions If Any:
SISTER
  FH: diabetes mellitus
 
 
Psychosocial History
Where Do You Live? Home
Who Do You Live With? self
Services at Home: Home Health Aide, Nursing
Primary Language: English
Smoking Status: Never Smoked
ETOH Use: former heavy use
Illicit Drug Use: denies illicit drug use
 
Functional Ability
Ambulation: walker
 
Exam & Diagnostic Data
Vital Signs and I&O
Vital Signs
 
 
Date Time Temp Pulse Resp B/P B/P Pulse O2 O2 Flow FiO2
 
     Mean Ox Delivery Rate 
 
04/18 1423       Room Air  
 
04/18 1357  82  116/70     
 
04/18 0554 98.2 74 20 110/62  97 Room Air  
 
04/18 0133 98.3 76 20 122/60  97 Room Air  
 
04/18 0000      94 Room Air  
 
04/18 0000 98.7 70 18 166/64  94 Room Air  
 
04/17 1600 100.1 80 19 120/80  93 Room Air  
 
 
 Intake & Output
 
 
 04/18 1600 04/18 0800 04/18 0000
 
Intake Total 480 110 240
 
Output Total 700 300 450
 
Balance -220 -190 -210
 
    
 
Intake, IV  10 
 
Intake, Oral 480 100 240
 
Output, Urine 700 300 450
 
Patient  273 lb 
 
Weight   
 
 
 
Physical Exam:
Overweight, edema both LE, comfortable at rest but c/o pain on any passive 
movement of legs
Awake
oriented to GH and name, not month,yr, season
no language errors
recall impaired
affect normal
VFF, EOMI, P4ERRL
face symmetric and lower CN normal
power/tone normal BUE
coord slow but not ataxic
sensation preserved to light touch all 4 extremities
DTRs hypoactive, unable to test in LE
Last 48 Hours of Lab Results:
 Laboratory Tests
 
 
 04/18 04/18 04/18
 
 0915 0915 0706
 
Chemistry   
 
  Sodium (137 - 145 mmol/L)   137
 
  Potassium (3.5 - 5.1 mmol/L)   3.9
 
  Chloride (98 - 107 mmol/L)   103
 
  Carbon Dioxide (22 - 30 mmol/L)   27
 
  Anion Gap (5 - 16)   7
 
  BUN (9 - 20 mg/dL)   19
 
  Creatinine (0.7 - 1.2 mg/dL)   0.8
 
  Estimated GFR (>60 ml/min)   > 60
 
  BUN/Creatinine Ratio (7 - 25 %)   23.8
 
Hematology   
 
  CBC w Diff   NO MAN DIFF REQ
 
  WBC (4.8 - 10.8 /CUMM)   5.0
 
  RBC (4.70 - 6.10 /CUMM)   3.49  L
 
  Hgb (14.0 - 18.0 G/DL)   10.2  L
 
  Hct (42 - 52 %)   31.1  L
 
  MCV (80.0 - 94.0 FL)   89.2
 
  MCH (27.0 - 31.0 PG)   29.2
 
  MCHC (33.0 - 37.0 G/DL)   32.7  L
 
  RDW (11.5 - 14.5 %)   15.1  H
 
  Plt Count (130 - 400 /CUMM)   134
 
  MPV (7.4 - 10.4 FL)   9.1
 
  Gran % (42.2 - 75.2 %)   61.7
 
  Lymphocytes % (20.5 - 51.1 %)   23.0
 
  Monocytes % (1.7 - 9.3 %)   13.3  H
 
  Eosinophils % (0 - 5 %)   1.5
 
  Basophils % (0.0 - 2.0 %)   0.5
 
  Absolute Granulocytes (1.4 - 6.5 /CUMM)   3.1
 
  Absolute Lymphocytes (1.2 - 3.4 /CUMM)   1.2
 
  Lymphocytes (%) 4 1 
 
  Absolute Monocytes (0.10 - 0.60 /CUMM)   0.7  H
 
  Absolute Eosinophils (0.0 - 0.7 /CUMM)   0.1
 
  Absolute Basophils (0.0 - 0.2 /CUMM)   0
 
  % Normal PMNs (%) 86 92 
 
  Misc Hematology Test (%) 9 7 
 
Other Body Source   
 
  Fluid WBC (0 - 5 /CUMM) 6336  H 3608  H 
 
  Fld Total RBCs Counted (0 /CUMM) 1402743  H 29104  H 
 
Urines   
 
  Sodium Urate Crystals (NONE)  RARE 
 
 
 
 
 04/17
 
 0450
 
Chemistry 
 
  Sodium (137 - 145 mmol/L) 138
 
  Potassium (3.5 - 5.1 mmol/L) 3.9
 
  Chloride (98 - 107 mmol/L) 103
 
  Carbon Dioxide (22 - 30 mmol/L) 25
 
  Anion Gap (5 - 16) 10
 
  BUN (9 - 20 mg/dL) 22  H
 
  Creatinine (0.7 - 1.2 mg/dL) 0.7
 
  Estimated GFR (>60 ml/min) > 60
 
  BUN/Creatinine Ratio (7 - 25 %) 31.4  H
 
Hematology 
 
  CBC w Diff NO MAN DIFF REQ
 
  WBC (4.8 - 10.8 /CUMM) 5.4
 
  RBC (4.70 - 6.10 /CUMM) 3.53  L
 
  Hgb (14.0 - 18.0 G/DL) 10.0  L
 
  Hct (42 - 52 %) 31.3  L
 
  MCV (80.0 - 94.0 FL) 88.7
 
  MCH (27.0 - 31.0 PG) 28.4
 
  MCHC (33.0 - 37.0 G/DL) 32.0  L
 
  RDW (11.5 - 14.5 %) 14.5
 
  Plt Count (130 - 400 /CUMM) 121  L
 
  MPV (7.4 - 10.4 FL) 8.7
 
  Gran % (42.2 - 75.2 %) 65.4
 
  Lymphocytes % (20.5 - 51.1 %) 20.0  L
 
  Monocytes % (1.7 - 9.3 %) 13.7  H
 
  Eosinophils % (0 - 5 %) 0.8
 
  Basophils % (0.0 - 2.0 %) 0.1
 
  Absolute Granulocytes (1.4 - 6.5 /CUMM) 3.6
 
  Absolute Lymphocytes (1.2 - 3.4 /CUMM) 1.1  L
 
  Absolute Monocytes (0.10 - 0.60 /CUMM) 0.7  H
 
  Absolute Eosinophils (0.0 - 0.7 /CUMM) 0
 
  Absolute Basophils (0.0 - 0.2 /CUMM) 0
 
 
 
Imaging/Other Studies:
Dopplers:  Occlusion RAZ
non-significant stenosis LICA
vertebral flows antegrade
 
CT Head:  Age-appropriate moderate diffuse cortical atrophy is noted, unchanged 
since
02/03/2016. Mild chronic microvascular deep white matter ischemic changes are
also present, unchanged. Specifically, no evidence of intra-axial mass, mass
effect, extra-axial fluid collection, midline shift, acute intraparenchymal
hemorrhage and/or acute infarction present.
 
 
Assessment/Plan
Assessment:
Unwitnessed fall in challenged elderly male with severe edema BLE, previously 
using walker and witnessed fall at day care a week earlier.
No evidence of syncope but possible, does have AF and cardiac disease
Occlusion of carotid will not cause black-out, incidental finding
Recommendations:
I personally feel the risk of further falls, potential head injury with bleed or
other hemorrhagic complications on full anticoagulation outweighs the risk of 
cardioembolism and think full anticoagulation should be stopped.  
Plavix and ASA substituted. 
Review decision with cardiology
 
 
Consult Acknowledgment
- Thank you for your consult request.

## 2018-04-19 VITALS — SYSTOLIC BLOOD PRESSURE: 104 MMHG | DIASTOLIC BLOOD PRESSURE: 68 MMHG

## 2018-04-19 VITALS — SYSTOLIC BLOOD PRESSURE: 108 MMHG | DIASTOLIC BLOOD PRESSURE: 64 MMHG

## 2018-04-19 VITALS — DIASTOLIC BLOOD PRESSURE: 64 MMHG | SYSTOLIC BLOOD PRESSURE: 100 MMHG

## 2018-04-19 LAB
ABSOLUTE GRANULOCYTE CT: 3.8 /CUMM (ref 1.4–6.5)
BASOPHILS # BLD: 0 /CUMM (ref 0–0.2)
BASOPHILS NFR BLD: 0.6 % (ref 0–2)
EOSINOPHIL # BLD: 0.1 /CUMM (ref 0–0.7)
EOSINOPHIL NFR BLD: 1.2 % (ref 0–5)
ERYTHROCYTE [DISTWIDTH] IN BLOOD BY AUTOMATED COUNT: 14.5 % (ref 11.5–14.5)
GRANULOCYTES NFR BLD: 70.1 % (ref 42.2–75.2)
HCT VFR BLD CALC: 30.2 % (ref 42–52)
LYMPHOCYTES # BLD: 0.9 /CUMM (ref 1.2–3.4)
MCH RBC QN AUTO: 29.5 PG (ref 27–31)
MCHC RBC AUTO-ENTMCNC: 33.3 G/DL (ref 33–37)
MCV RBC AUTO: 88.7 FL (ref 80–94)
MONOCYTES # BLD: 0.6 /CUMM (ref 0.1–0.6)
PLATELET # BLD: 154 /CUMM (ref 130–400)
PMV BLD AUTO: 9.6 FL (ref 7.4–10.4)
RED BLOOD CELL CT: 3.4 /CUMM (ref 4.7–6.1)
WBC # BLD AUTO: 5.4 /CUMM (ref 4.8–10.8)

## 2018-04-19 NOTE — PN- HOUSESTAFF
Brett Dover 18 0749:
Subjective
Follow-up For:
Syncope
Generalized weakness
Lactic acidosis resolved
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
Bilateral knee effusion, bilateral knee pain, knee arthritis
Tele-Events Since Last Visit:
SCARLETT juares, [id number], no events
Subjective:
The patient was seen and examined.  Poor historian.  Still reports pain in his 
knees.  Able to leave his legs now.  Does not allow for physical exam on the 
lower extremities.  He denies any headache, nausea, vomiting, dizziness, 
lightheadedness, chest pain, shortness of breath, abdominal pain.
 
VSS.  No events reported.
 
 
 
Review of Systems
Constitutional:
Reports: no symptoms. 
 
Objective
Last 24 Hrs of Vital Signs/I&O
 Vital Signs
 
 
Date Time Temp Pulse Resp B/P B/P Pulse O2 O2 Flow FiO2
 
     Mean Ox Delivery Rate 
 
 0810 98.4 68 20 108/64     
 
 0647 98.4 68 20 108/64  96 Room Air  
 
 2235 99.0 76 20 102/60  96 Room Air  
 
 1527 98.0 95 20 110/60  98 Room Air  
 
 1423       Room Air  
 
 1357  82  116/70     
 
 
 Intake & Output
 
 
  1600  0800  0000
 
Intake Total  610 350
 
Output Total  225 700
 
Balance  385 -350
 
    
 
Intake, IV  10 
 
Intake, Oral  600 350
 
Output, Urine  225 700
 
Patient  287 lb 286 lb
 
Weight   
 
 
 
 
Physical Exam
General Appearance: Alert, Cooperative
Other Physical Findings:
Skin: No Rashes
HEENT: Atraumatic, PERRLA, EOMI, Mucous Membr. moist/pink
Neck: Supple
Lymphatic: Cervical nl
Cardiovascular: Irregularly irregular
Lungs: Clear to Auscultation, Normal Air Movement
Abdomen: Normal Bowel Sounds, Soft, No Tenderness, No Hepatospenomegaly, No 
Masses
Neurological: Normal Speech, Normal Tone, ROM, strength intact in UE, unable to 
test LEs given  pain 
Extremities: b/l LE edema, knees tenderness to palpation
Vascular: Pulses Symmetrical
Current Medications:
 Current Medications
 
 
  Sig/Angela Start time  Last
 
Medication Dose Route Stop Time Status Admin
 
Acetaminophen 650 MG Q6P PRN 04/15 1715 AC 
 
  PO   
 
Albuterol Sulfate 2 PUF Q4-6 PRN PRN 04/15 1830 AC 
 
  INH   
 
Apixaban 5 MG BID  0900 DC 
 
  PO   2127
 
Aspirin Buffered 81 MG DAILY  1108 AC 
 
  PO   0810
 
Atorvastatin Calcium 20 MG 1700  1700 AC 
 
  PO   1722
 
Budesonide/ 2 PUF BID 04/15 2100 AC 
 
Formoterol Fumarate  INH   0810
 
Docusate Sodium 100 MG DAILY  0900 AC 
 
  PO   0810
 
Furosemide 20 MG 7:30 AM, & 4:30 PM  0730 AC 
 
  PO   0756
 
Furosemide 20 MG ONCE ONE  2345 DC 
 
  PO  2346  0032
 
Heparin Sodium  5,000 UNIT .STK-MED ONE 2057 DC 
 
(Porcine)  IV 2058  
 
Hydrocodone Bitart/ 1 TAB Q8P PRN 04/15 1730 AC 
 
Acetaminophen  PO   1036
 
Metoprolol Succinate 50 MG DAILY  0900 AC 
 
  PO   0810
 
Oxybutynin Chloride 5 MG TID  09 AC 
 
  PO   0810
 
Sertraline HCl 50 MG DAILY 04/15 1817 AC 
 
  PO   0810
 
 
 
 
Assessment/Plan
Assessment:
This is a 85-year-old male with past medical history significant for A. fib on 
apixaban, heart failure with reduced ejection fraction, mitral stenosis, 
prosthetic aortic valve, CAD status post CABG and aortic valve replacement, COPD
/asthma, gout, osteoarthritis, psoriasis, HTN, HLD, OA, who presents after 
suffering an unwitnessed fall while at home. 
 
#Unwitnessed fall/questionable syncope:
 Patient is a poor historian and much of the history was obtained from his niece
on admission who participates in his care.  
* c/w telemtry monitor
* Serial troponin and EKG negative
* EKG showed atrial fibrillation, rate under control no acute ST-T wave changes
* Cardiologist recs appreciated. 
* Per cardio stop apixaban for 24 hours now that sanguinus fluid noted on joint 
aspiration.
* Patient has history of bioprosthetic aortic valve.  Echocardiogram is pending.
 
 
#Generalized weakness:
* c/w PT
 
#Bilateral knee pain: 
* No fever, leukocytosis.  However  history of psoriasis,  on adalimumab 
* Uric acid was 6.1.  Less likely gout.
* Most possibly bilateral knee osteoarthritis, end-stage, flare up from recent 
fall 
* Knee joint aspiration revealed bilateral sanguinous fluid.
* Orthopedic consult appreciated; follow recs
* Per cardio and neuro is okay to discontinue anticoagulation and maintain the 
patient on aspirin only.  
* c/w current pain mx regimen
* Will add scheduled Tylenol
* Needs to work with PT
 
#Right carotid artery complete occlusion
carotid doppler- 1. RIGHT: There is complete occlusion of the right internal 
carotid artery.
 2. LEFT: Minimal, nonhemodynamically significant stenosis of the proximalleft 
internal carotid artery corresponding to a 0-49% stenosis by velocity criteria. 
3. No evidence for hemodynamically significant stenosis in the external carotid 
arteries.
 
* Vascular surgery following.
* Family doesn't want any further escalation of care.  Additionally in the 
setting of carotid occlusion no surgical intervention is recommended.
* Recommended outpatient follow-up for surveillance of left internal carotid 
artery
* Will continue baby aspirin
 
#Bruise back of left thigh
Patient has Bruise on the back of his left thigh status post fall.  The borders 
were marked and has remained stable.  We'll continue to monitor H&H.  Will get 
CAT scan of by if hemoglobin drops to look for any hematoma
 
#History of coronary artery disease 
* Continue baby aspirin
#Hyperlipidemia 
* continue simvastatin
#History of atrial fibrillation
* c/w metoprolol succinate 50 daily
* apixaban on hold as above. 
#Hypertension:
* continue metoprolol succinate with holding parameters
#Chronic heart failure with reduced ejection fraction 
* Hold Lasix for now given syncopal episode
#Psoriasis 
* On adalimumab 40 mg injection every 2 weeks
 
#Overactive bladder:
*  continue oxybutynin
#Depression:
*  continue Zoloft
 
Problem List:
 1. Syncope and collapse
 
 2. Weakness
 
Pain Ratin
Pain Location:
Knees
Pain Goal: Pain 4 or less
Pain Plan:
When necessary Vicodin, scheduled Tylenol
Tomorrow's Labs & Rationales:
BEPand magnesium to monitor electrolytes
CBC to monitor H&H
 
 
Megha Robins 18 1115:
Attending MD Review Statement
 
Attending Statement
Attending MD Statement: examined this patient, discuss w/resident/PA/NP, agreed 
w/resident/PA/NP, discussed with family, reviewed EMR data (avail), discussed 
with nursing, discussed with case mgmt, reviewed images, amended to note
Attending Assessment/Plan:
Patient seen/examined bedside. Carotid USG shows complete occlusion of right 
carotids not amenable to surgical intervention as per vascular surgery. Patient 
c/o b/l knee pain. He is on PO meds for pain control. Patient anticoagulation 
held for high risk of falls and now found to have hemarthrosis. Cardiology and 
Neurology recommend to disconitnue anticoagulation. Patient also on low dose 
diuretics as per cardiology recommendations. He has mild hyponetremia whch is 
asymptomatic. Woill watch for soidum levels and provide pain control. Patient 
encouraged to work with PT. Plan is to discharge to [facility].

## 2018-04-20 VITALS — DIASTOLIC BLOOD PRESSURE: 60 MMHG | SYSTOLIC BLOOD PRESSURE: 108 MMHG

## 2018-04-20 VITALS — SYSTOLIC BLOOD PRESSURE: 110 MMHG | DIASTOLIC BLOOD PRESSURE: 62 MMHG

## 2018-04-20 NOTE — PATIENT DISCHARGE INSTRUCTIONS
Discharge Instructions
 
General Discharge Information
You were seen/treated for:
Syncope
Generalized weakness
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
Bilateral knee effusion, bilateral knee pain, knee arthritis
Special Instructions:
-Please follow-up with PCP in one week after discharge.
-Please follow up with vascular surgeon, Dr. Hare in 1 week after discharge.
-Please follow-up with cardiologist, Dr. Armas in 1 week after discharge.
-Please follow-up with orthopedics Dr. Hollis in 1 week after discharge.
-Please follow up with Neurologist, Dr. Parry in 1 week after discharge.
 
 
Diet
Continue normal diet: Yes
Recommended Diet: Heart Healthy
 
Activity
Additional ACTIVITY Info:
As tolerated/walker
 
Acute Coronary Syndrome
 
Inclusion Criteria
At DC or during hospital stay patient has or had the following:
 
Discharge Core Measures
Meds if any: Prescribed or Continued at Discharge
Meds if any: NOT Prescribed or Continued at Discharge
 
Congestive Heart Failure
 
Inclusion Criteria
At DC or during hospital stay patient has or had the following:
 
Discharge Core Measures
Meds if any: Prescribed or Continued at Discharge
Meds if any: NOT Prescribed or Continued at Discharge
 
Cerebrovascular accident
 
Inclusion Criteria
At DC or during hospital stay patient has or had the following:
CVA/TIA Diagnosis No
 
Discharge Core Measures
Meds if any: Prescribed or Continued at Discharge
Meds if any: NOT Prescribed or Continued at Discharge
 
Venous thromboembolism
 
Discharge Core Measures
- Per Current guidelines, there needs to be overlap
- treatment for the first 5 days of Warfarin therapy.
- If discharged on Warfarin prior to 5 days of
- overlap therapy, the patient will need to be
- assessed for post discharge needs including
- *Post discharge parental anticoagulation
- *Warfarin and/or parental anticoagulation education
- *Follow up date to check INR post discharge
Meds if any: Prescribed or Continued at Discharge
Note: Overlap Therapy is Warfarin and Anticoagulant
Meds if any: NOT Prescribed or Continued at Discharge

## 2020-07-18 NOTE — PN- HOUSESTAFF
Brett Dover 18 0800:
Subjective
Follow-up For:
Syncope
Generalized weakness
Lactic acidosis resolved
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
Bilateral knee effusion, bilateral knee pain, knee arthritis
Subjective:
The patient was seen and examined.  Poor historian. Denies any pain in his 
knees.  Able to lift his legs now.   He denies any headache, nausea, vomiting, 
dizziness, lightheadedness, chest pain, shortness of breath, abdominal pain.
 
VSS.  No events reported.
 
Review of Systems
Constitutional:
Reports: no symptoms. 
 
Objective
Last 24 Hrs of Vital Signs/I&O
 Vital Signs
 
 
Date Time Temp Pulse Resp B/P B/P Pulse O2 O2 Flow FiO2
 
     Mean Ox Delivery Rate 
 
 1729 98.2 73 18 110/62     
 
 0837  73  110/62     
 
 0524 98.2 62 18 108/60  96 Room Air  
 
 2305 99.1 85 18 100/64  93 Room Air  
 
 
 Intake & Output
 
 
  1600  0800  0000
 
Intake Total 460 20 700
 
Output Total 
 
Balance 110 -780 -355
 
    
 
Intake, IV 10  
 
Intake, Oral 450 20 700
 
Number 1  
 
Bowel   
 
Movements   
 
Output, Urine 
 
Patient 251 lb  251 lb
 
Weight   
 
 
 
 
Physical Exam
General Appearance: Alert, Cooperative, No Acute Distress
Other Physical Findings:
Skin: No Rashes
HEENT: Atraumatic, PERRLA, EOMI, Mucous Membr. moist/pink
Neck: Supple
Lymphatic: Cervical nl
Cardiovascular: Irregularly irregular
Lungs: Clear to Auscultation, Normal Air Movement
Abdomen: Normal Bowel Sounds, Soft, No Tenderness, No Hepatospenomegaly, No 
Masses
Neurological: Normal Speech, Normal Tone, ROM, strength intact in UE
Extremities: b/l LE edema, knees non-tender to palpation
Vascular: Pulses Symmetrical
Current Medications:
 Current Medications
 
 
  Sig/Angela Start time  Last
 
Medication Dose Route Stop Time Status Admin
 
Acetaminophen 650 MG .STK-MED ONE  1154 DC 
 
  PO  1155  
 
Acetaminophen 650 MG .STK-MED ONE  0506 DC 
 
  PO  0507  
 
Acetaminophen 650 MG .STK-MED ONE  0008 DC 
 
  PO  0009  
 
Acetaminophen 650 MG Q6  1200 DCD 
 
  PO   1201
 
Albuterol Sulfate 2 PUF Q4-6 PRN PRN 04/15 1830 DCD 
 
  INH   
 
Aspirin Buffered 81 MG DAILY  1108 DCD 
 
  PO   0837
 
Atorvastatin Calcium 20 MG 1700  1700 DCD 
 
  PO   1525
 
Bisacodyl 10 MG ONCE ONE  1200 DC 
 
  WA  1201  1201
 
Budesonide/ 2 PUF BID 04/15 2100 DCD 
 
Formoterol Fumarate  INH   0837
 
Docusate Sodium 100 MG DAILY  0900 DCD 
 
  PO   0837
 
Furosemide 20 MG 7:30 AM, & 4:30 PM  0730 DCD 
 
  PO   1525
 
Hydrocodone Bitart/ 1 TAB Q8P PRN 04/15 1730 DCD 
 
Acetaminophen  PO   1036
 
Metoprolol Succinate 50 MG DAILY  0900 DCD 
 
  PO   0837
 
Oxybutynin Chloride 5 MG TID  0900 DCD 
 
  PO   1246
 
Patient Medication  1 ED ONE ONE  1730 DC 
 
Teaching  ED  1731  
 
Sertraline HCl 50 MG DAILY 04/15 1817 DCD 
 
  PO   0837
 
 
 
 
Assessment/Plan
Assessment:
This is a 85-year-old male with past medical history significant for A. fib on 
apixaban, heart failure with reduced ejection fraction, mitral stenosis, 
prosthetic aortic valve, CAD status post CABG and aortic valve replacement, COPD
/asthma, gout, osteoarthritis, psoriasis, HTN, HLD, OA, who presents after 
suffering an unwitnessed fall while at home. 
 
#Unwitnessed fall/questionable syncope:
 Patient is a poor historian and much of the history was obtained from his niece
on admission who participates in his care.  
* c/w telemtry monitor
* Serial troponin and EKG negative
* EKG showed atrial fibrillation, rate under control no acute ST-T wave changes
* Cardiologist recs appreciated. 
* Per cardio stop apixaban and maintain the patient on aspirin only now that 
sanguinus fluid noted on joint aspiration.
* Patient has history of bioprosthetic aortic valve.  Echocardiogram is pending.
 
 
#Generalized weakness:
* c/w PT
 
#Bilateral knee pain: 
* No fever, leukocytosis.  However  history of psoriasis,  on adalimumab 
* Uric acid was 6.1.  Less likely gout.
* Most possibly bilateral knee osteoarthritis, end-stage, flare up from recent 
fall 
* Knee joint aspiration revealed bilateral sanguinous fluid.
* Orthopedic consult appreciated; follow recs
* Per cardio and neuro is okay to discontinue anticoagulation and maintain the 
patient on aspirin only.  
* c/w current pain mx regimen
* c/w scheduled Tylenol
* Needs to work with PT
 
#Right carotid artery complete occlusion
carotid doppler- 1. RIGHT: There is complete occlusion of the right internal 
carotid artery.
 2. LEFT: Minimal, nonhemodynamically significant stenosis of the proximalleft 
internal carotid artery corresponding to a 0-49% stenosis by velocity criteria. 
3. No evidence for hemodynamically significant stenosis in the external carotid 
arteries.
 
* Vascular surgery following.
* Family doesn't want any further escalation of care.  Additionally in the 
setting of carotid occlusion no surgical intervention is recommended.
* Recommended outpatient follow-up for surveillance of left internal carotid 
artery
* Will continue baby aspirin
 
#Bruise back of left thigh
Patient has Bruise on the back of his left thigh status post fall.  The borders 
were marked and has remained stable.  We'll continue to monitor H&H.  Will get 
CAT scan of by if hemoglobin drops to look for any hematoma
 
#History of coronary artery disease 
* Continue baby aspirin
#Hyperlipidemia 
* continue simvastatin
#History of atrial fibrillation
* c/w metoprolol succinate 50 daily
* apixaban on hold as above. 
#Hypertension:
* continue metoprolol succinate with holding parameters
#Chronic heart failure with reduced ejection fraction 
* Hold Lasix for now given syncopal episode
#Psoriasis 
* On adalimumab 40 mg injection every 2 weeks
 
#Overactive bladder:
*  continue oxybutynin
#Depression:
*  continue Zoloft
Problem List:
 1. Syncope and collapse
 
 2. Weakness
 
Pain Ratin
Pain Location:
Knees
Pain Goal: Pain 4 or less
Pain Plan:
Scheduled tylenol
Tomorrow's Labs & Rationales:
Megha Hurt 18 1044:
Attending MD Review Statement
 
Attending Statement
Attending MD Statement: examined this patient, discuss w/resident/PA/NP, agreed 
w/resident/PA/NP, discussed with family, reviewed EMR data (avail), discussed 
with nursing, discussed with case mgmt, reviewed images, amended to note
Attending Assessment/Plan:
Patient tolerating diuretics and working with PT. His knee pain is well 
controlled now. Patient vital stable for discharge. Patient can be discharged to
[facility]. intact